# Patient Record
Sex: FEMALE | Race: WHITE | NOT HISPANIC OR LATINO | Employment: PART TIME | URBAN - METROPOLITAN AREA
[De-identification: names, ages, dates, MRNs, and addresses within clinical notes are randomized per-mention and may not be internally consistent; named-entity substitution may affect disease eponyms.]

---

## 2018-10-20 ENCOUNTER — OFFICE VISIT (OUTPATIENT)
Dept: URGENT CARE | Facility: CLINIC | Age: 23
End: 2018-10-20
Payer: COMMERCIAL

## 2018-10-20 VITALS
HEART RATE: 97 BPM | RESPIRATION RATE: 20 BRPM | HEIGHT: 69 IN | DIASTOLIC BLOOD PRESSURE: 69 MMHG | OXYGEN SATURATION: 95 % | TEMPERATURE: 98 F | BODY MASS INDEX: 24.44 KG/M2 | SYSTOLIC BLOOD PRESSURE: 109 MMHG | WEIGHT: 165 LBS

## 2018-10-20 DIAGNOSIS — L72.3 INFECTED SEBACEOUS CYST OF SKIN: Primary | ICD-10-CM

## 2018-10-20 DIAGNOSIS — L08.9 INFECTED SEBACEOUS CYST OF SKIN: Primary | ICD-10-CM

## 2018-10-20 PROCEDURE — 3008F BODY MASS INDEX DOCD: CPT | Mod: CPTII,S$GLB,, | Performed by: FAMILY MEDICINE

## 2018-10-20 PROCEDURE — 99214 OFFICE O/P EST MOD 30 MIN: CPT | Mod: S$GLB,,, | Performed by: FAMILY MEDICINE

## 2018-10-20 RX ORDER — MUPIROCIN 20 MG/G
OINTMENT TOPICAL
Qty: 22 G | Refills: 1 | Status: SHIPPED | OUTPATIENT
Start: 2018-10-20 | End: 2021-07-13

## 2018-10-20 RX ORDER — SULFAMETHOXAZOLE AND TRIMETHOPRIM 800; 160 MG/1; MG/1
1 TABLET ORAL 2 TIMES DAILY
Qty: 20 TABLET | Refills: 0 | Status: SHIPPED | OUTPATIENT
Start: 2018-10-20 | End: 2021-07-13

## 2018-10-20 RX ORDER — IBUPROFEN 800 MG/1
800 TABLET ORAL EVERY 8 HOURS PRN
Qty: 30 TABLET | Refills: 1 | Status: SHIPPED | OUTPATIENT
Start: 2018-10-20 | End: 2019-10-20

## 2018-10-20 RX ORDER — LISDEXAMFETAMINE DIMESYLATE 50 MG/1
50 CAPSULE ORAL
Refills: 0 | COMMUNITY
Start: 2018-09-26 | End: 2021-09-28 | Stop reason: ALTCHOICE

## 2018-10-20 NOTE — PROGRESS NOTES
"Subjective:       Patient ID: Koki Solano is a 23 y.o. female.    Vitals:  height is 5' 9" (1.753 m) and weight is 74.8 kg (165 lb). Her tympanic temperature is 98.4 °F (36.9 °C). Her blood pressure is 109/69 and her pulse is 97. Her respiration is 20 and oxygen saturation is 95%.     Chief Complaint: Abscess    This is a 23 y.o. female   with Past Medical History:  No date: ADD (attention deficit disorder with hyperactivity)  No date: Depression  No date: History of psychiatric care  No date: Psychiatric exam  No date: Psychiatric problem  No date: Self-injurious behavior  No date: Suicide attempt  No date: Therapy   and Past Surgical History:  January 2014: APPENDECTOMY  who presents today with a chief complaint of an abscess on the back of her neck that began to hurt a week ago. It's red and has grown in size.      Abscess   Chronicity:  NewProgression Since Onset: gradually worsening  Location:  Head/neck  Associated Symptoms: no fever, no chills  Characteristics: painful, redness and swelling    Pain Scale:  6/10  Treatments Tried:  Nothing  Relieved by:  Nothing    Review of Systems   Constitution: Negative for chills and fever.   HENT: Negative for sore throat.    Eyes: Negative for blurred vision.   Cardiovascular: Negative for chest pain.   Respiratory: Negative for shortness of breath.    Skin: Positive for color change. Negative for rash.   Musculoskeletal: Negative for back pain and joint pain.   Gastrointestinal: Negative for abdominal pain, diarrhea, nausea and vomiting.   Neurological: Negative for headaches.   Psychiatric/Behavioral: The patient is not nervous/anxious.        Objective:      Physical Exam   Constitutional: She appears well-developed and well-nourished.   Cardiovascular: Normal rate and regular rhythm.   Pulmonary/Chest: Effort normal and breath sounds normal.   Skin: Lesion (pea sized nodular lesion lower cervical region, tender) noted. There is erythema.   Nursing note and vitals " reviewed.      Assessment:       1. Infected sebaceous cyst of skin        Plan:         Infected sebaceous cyst of skin  -     Cancel: XR HIP 2 VIEW RIGHT; Future; Expected date: 10/20/2018  -     sulfamethoxazole-trimethoprim 800-160mg (BACTRIM DS) 800-160 mg Tab; Take 1 tablet by mouth 2 (two) times daily.  Dispense: 20 tablet; Refill: 0  -     mupirocin (BACTROBAN) 2 % ointment; Apply to affected area 3 times daily  Dispense: 22 g; Refill: 1  -     ibuprofen (ADVIL,MOTRIN) 800 MG tablet; Take 1 tablet (800 mg total) by mouth every 8 (eight) hours as needed.  Dispense: 30 tablet; Refill: 1  -     Incision & Drainage    followup with PCP

## 2018-10-20 NOTE — PATIENT INSTRUCTIONS
Epidermoid Cyst (Sebaceous Cyst), Infected (Antibiotic Treatment)  You have an epidermoid cyst. This is a small, painless lump under your skin. An epidermoid cyst (often called a sebaceous cyst, epidermal cyst, or epidermal inclusion cyst) is a term most often used for 2 similar types of cysts:  · Epidermoid cysts. These cysts form slowly under the skin. They can be found on most parts of the body. But they are most often found on areas with more hair such as the scalp, face, upper back, and genitals.  · Pilar cysts. These are similar to epidermoid cysts. But they start from a different part of the hair follicle. They are more likely to be on the scalp.  Here are some general facts about these cysts:  · A cyst is a sac filled with material that is often cheesy, fatty, oily, or stringy. The material inside can be thick. Or it can be a liquid.  · You can usually move the cyst slightly if you try.  · The cysts can be smaller than a pea or as large as a few inches.  · The cysts are usually not painful, unless they become inflamed or infected.  · The area around the cyst may smell bad. If the cyst breaks open, the material inside it often smells bad as well.  Your cyst became infected and your healthcare provider wanted to treat it with antibiotics. If the antibiotics dont clear up the infection, the cyst will need to be drained by making a small cut (incision). Local anesthesia will be used to numb the area.  Home care  · Resist the temptation to squeeze or pop the cyst, stick a needle in it, or cut it open. This often leads to a worsening infection and scarring.  · Take the antibiotic as directed until it is all used up.  · Soak the affected area in hot water or apply a hot pack (a thin, clean towel soaked in hot water) for 20 minutes at a time. Do this 3 to 4 times a day.  · Apply antibiotic cream or ointment 3 times a day.  · You may use over-the-counter pain medicine to control pain, unless another medicine was  given. If you have chronic liver or kidney disease or ever had a stomach ulcer or GI bleeding, talk with your healthcare provider before using these medicines.  Prevention  Once this infection has healed, reduce the risk of future infections by:  · Keeping the cyst area clean by bathing or showering daily  · Avoiding tight-fitting clothing in the cyst area  Follow-up care  Follow up with your healthcare provider, or as advised. If a gauze packing was put in your wound, it should be removed in a few days as advised by your healthcare provider. Check your wound every day for the signs listed below.  When to seek medical advice  Call your healthcare provider right away if any of these occur:  · Pus coming from the cyst  · Increasing redness around the wound  · Increasing local pain or swelling  · Fever of 100.4°F (38ºC) or higher, or as directed by your provider  Date Last Reviewed: 10/5/2016  © 1947-2849 [employer]. [address] [place] [address], [place] [address]. All rights reserved. This information is not intended as a substitute for professional medical care. Always follow your healthcare professional's instructions.

## 2018-10-20 NOTE — PROCEDURES
"Incision & Drainage  Date/Time: 10/20/2018 3:58 PM  Performed by: Camacho Pham MD  Authorized by: Camacho Pham MD     Time out: Immediately prior to procedure a "time out" was called to verify the correct patient, procedure, equipment, support staff and site/side marked as required.    Consent Done?:  Yes (Verbal)    Type:  Cyst (infected)  Body area:  Head/neck  Location details:  Neck  Anesthesia:  Local infiltration  Local anesthetic: lidocaine 1% with epinephrine  Anesthetic total (ml):  2  Risk factor:  Underlying major vessel  Scalpel size:  11  Incision type:  Single straight  Complexity:  Simple  Drainage:  Purulent  Drainage amount:  Scant  Wound treatment:  Wound left open, sebum removal, deloculation and expression of material (sebum and puruentt drainage)  Packing material:  None  Patient tolerance:  Patient tolerated the procedure well with no immediate complications      "

## 2019-04-22 ENCOUNTER — OFFICE VISIT (OUTPATIENT)
Dept: OBSTETRICS AND GYNECOLOGY | Facility: CLINIC | Age: 24
End: 2019-04-22
Payer: COMMERCIAL

## 2019-04-22 VITALS — HEIGHT: 69 IN | BODY MASS INDEX: 26.15 KG/M2 | WEIGHT: 176.56 LBS

## 2019-04-22 DIAGNOSIS — Z30.431 IUD CHECK UP: Primary | ICD-10-CM

## 2019-04-22 PROCEDURE — 99203 PR OFFICE/OUTPT VISIT, NEW, LEVL III, 30-44 MIN: ICD-10-PCS | Mod: S$GLB,,, | Performed by: NURSE PRACTITIONER

## 2019-04-22 PROCEDURE — 3008F BODY MASS INDEX DOCD: CPT | Mod: CPTII,S$GLB,, | Performed by: NURSE PRACTITIONER

## 2019-04-22 PROCEDURE — 99203 OFFICE O/P NEW LOW 30 MIN: CPT | Mod: S$GLB,,, | Performed by: NURSE PRACTITIONER

## 2019-04-22 PROCEDURE — 99999 PR PBB SHADOW E&M-EST. PATIENT-LVL III: ICD-10-PCS | Mod: PBBFAC,,, | Performed by: NURSE PRACTITIONER

## 2019-04-22 PROCEDURE — 3008F PR BODY MASS INDEX (BMI) DOCUMENTED: ICD-10-PCS | Mod: CPTII,S$GLB,, | Performed by: NURSE PRACTITIONER

## 2019-04-22 PROCEDURE — 99999 PR PBB SHADOW E&M-EST. PATIENT-LVL III: CPT | Mod: PBBFAC,,, | Performed by: NURSE PRACTITIONER

## 2019-04-22 NOTE — PROGRESS NOTES
CC: IUD string check    History of Present Illness: Koki Solano is a 23 y.o. female that presents today 4/22/2019 for   Pt [facility] wanting to have IUD strings checked. She had mirena IUD placed approx 2 years ago at [facility]. She reports that she has felt the strings in the past but has not been able to recently. She also got a cycle this past month, which is different for her because she has not had cycle since she had it placed. No other complaints or concerns noted.       Past Medical History:   Diagnosis Date    ADD (attention deficit disorder with hyperactivity)     Depression     History of psychiatric care     Psychiatric exam     Psychiatric problem     Self-injurious behavior     Suicide attempt     Therapy        Past Surgical History:   Procedure Laterality Date    APPENDECTOMY  January 2014       Current Outpatient Medications   Medication Sig Dispense Refill    AMPHETAMINE SALT COMBO 20 MG tablet       amphetamine-dextroamphetamine (AMPHETAMINE-DEXTROAMPHETAMINE) 10 mg Tab Take 10 mg by mouth 2 (two) times daily as needed. 60 tablet 0    fluoxetine (PROZAC) 20 MG capsule Take 20 mg by mouth once daily.      hydrocodone-acetaminophen (HYCET) solution 2.5-108 mg/5mL       ibuprofen (ADVIL,MOTRIN) 800 MG tablet Take 1 tablet (800 mg total) by mouth every 8 (eight) hours as needed. 30 tablet 1    methylphenidate (CONCERTA) 54 MG CR tablet Take 54 mg by mouth every morning.        mupirocin (BACTROBAN) 2 % ointment Apply to affected area 3 times daily 22 g 1    sulfamethoxazole-trimethoprim 800-160mg (BACTRIM DS) 800-160 mg Tab Take 1 tablet by mouth 2 (two) times daily. 20 tablet 0    VYVANSE 50 mg capsule 50 mg.  0     No current facility-administered medications for this visit.        Review of patient's allergies indicates:  No Known Allergies    Family History   Problem Relation Age of Onset    Depression Mother        Social History  "    Tobacco Use    Smoking status: Never Smoker    Smokeless tobacco: Never Used   Substance Use Topics    Alcohol use: Yes     Comment: socially    Drug use: No       OB History   No data available       Review of Symptoms:  GENERAL: Denies weight gain or weight loss. Feeling well overall.   SKIN: Denies rash or lesions.   HEAD: Denies head injury or headache.   ABDOMEN: No abdominal pain, constipation, diarrhea, nausea, vomiting or rectal bleeding.   URINARY: No frequency, dysuria, hematuria, or burning on urination.    Ht 5' 9.02" (1.753 m)   Wt 80.1 kg (176 lb 9.4 oz)   LMP 04/19/2019   Physical Exam:  APPEARANCE: Well nourished, well developed, in no acute distress.  SKIN: Normal skin turgor, no lesions.  RESPIRATORY: Normal respiratory effort with no retractions or use of accessory muscles  ABDOMEN: Soft. No tenderness or masses. No hepatosplenomegaly. No hernias.  PELVIC: Normal external female genitalia without lesions. Normal hair distribution. Adequate perineal body, normal urethral meatus. Urethra with no masses.  Bladder nontender. Vagina moist and well rugated without lesions or discharge. Cervix pink and without lesions; IUD strings visible and palpable. No significant cystocele or rectocele. Bimanual exam showed uterus normal size, shape, position, mobile and nontender. Adnexa without masses or tenderness. Urethra and bladder normal.      ASSESSMENT/PLAN:  IUD check up        -D/w pt that is possible to still have some cycles/bleeding while having the mirena. Pt reassured since IUD strings were visualized.     Follow-up:  RTC as needed    "

## 2019-11-05 ENCOUNTER — HOSPITAL ENCOUNTER (EMERGENCY)
Facility: HOSPITAL | Age: 24
Discharge: HOME | End: 2019-11-05
Attending: EMERGENCY MEDICINE
Payer: COMMERCIAL

## 2019-11-05 VITALS
HEIGHT: 70 IN | RESPIRATION RATE: 18 BRPM | DIASTOLIC BLOOD PRESSURE: 66 MMHG | HEART RATE: 100 BPM | BODY MASS INDEX: 27.92 KG/M2 | SYSTOLIC BLOOD PRESSURE: 145 MMHG | OXYGEN SATURATION: 98 % | TEMPERATURE: 99 F | WEIGHT: 195 LBS

## 2019-11-05 DIAGNOSIS — F33.1 MODERATE EPISODE OF RECURRENT MAJOR DEPRESSIVE DISORDER: Primary | ICD-10-CM

## 2019-11-05 PROBLEM — F32.9 MAJOR DEPRESSIVE DISORDER: Status: ACTIVE | Noted: 2019-11-05

## 2019-11-05 LAB
ALBUMIN SERPL BCP-MCNC: 4.1 G/DL (ref 3.5–5.2)
ALP SERPL-CCNC: 66 U/L (ref 55–135)
ALT SERPL W/O P-5'-P-CCNC: 10 U/L (ref 10–44)
AMPHET+METHAMPHET UR QL: NEGATIVE
ANION GAP SERPL CALC-SCNC: 7 MMOL/L (ref 8–16)
APAP SERPL-MCNC: <3 UG/ML (ref 10–20)
AST SERPL-CCNC: 14 U/L (ref 10–40)
B-HCG UR QL: NEGATIVE
BACTERIA #/AREA URNS AUTO: ABNORMAL /HPF
BARBITURATES UR QL SCN>200 NG/ML: NEGATIVE
BASOPHILS # BLD AUTO: 0.04 K/UL (ref 0–0.2)
BASOPHILS NFR BLD: 0.4 % (ref 0–1.9)
BENZODIAZ UR QL SCN>200 NG/ML: NEGATIVE
BILIRUB SERPL-MCNC: 0.2 MG/DL (ref 0.1–1)
BILIRUB UR QL STRIP: NEGATIVE
BUN SERPL-MCNC: 8 MG/DL (ref 6–20)
BZE UR QL SCN: NEGATIVE
CALCIUM SERPL-MCNC: 9.3 MG/DL (ref 8.7–10.5)
CANNABINOIDS UR QL SCN: NEGATIVE
CHLORIDE SERPL-SCNC: 106 MMOL/L (ref 95–110)
CLARITY UR REFRACT.AUTO: ABNORMAL
CO2 SERPL-SCNC: 24 MMOL/L (ref 23–29)
COLOR UR AUTO: YELLOW
CREAT SERPL-MCNC: 0.8 MG/DL (ref 0.5–1.4)
CREAT UR-MCNC: 109 MG/DL (ref 15–325)
CTP QC/QA: YES
DIFFERENTIAL METHOD: ABNORMAL
EOSINOPHIL # BLD AUTO: 0.4 K/UL (ref 0–0.5)
EOSINOPHIL NFR BLD: 3.6 % (ref 0–8)
ERYTHROCYTE [DISTWIDTH] IN BLOOD BY AUTOMATED COUNT: 12.9 % (ref 11.5–14.5)
EST. GFR  (AFRICAN AMERICAN): >60 ML/MIN/1.73 M^2
EST. GFR  (NON AFRICAN AMERICAN): >60 ML/MIN/1.73 M^2
ETHANOL SERPL-MCNC: <10 MG/DL
GLUCOSE SERPL-MCNC: 105 MG/DL (ref 70–110)
GLUCOSE UR QL STRIP: NEGATIVE
HCT VFR BLD AUTO: 45.3 % (ref 37–48.5)
HGB BLD-MCNC: 14.4 G/DL (ref 12–16)
HGB UR QL STRIP: NEGATIVE
IMM GRANULOCYTES # BLD AUTO: 0.02 K/UL (ref 0–0.04)
IMM GRANULOCYTES NFR BLD AUTO: 0.2 % (ref 0–0.5)
KETONES UR QL STRIP: NEGATIVE
LEUKOCYTE ESTERASE UR QL STRIP: ABNORMAL
LYMPHOCYTES # BLD AUTO: 3.3 K/UL (ref 1–4.8)
LYMPHOCYTES NFR BLD: 34.3 % (ref 18–48)
MCH RBC QN AUTO: 28.6 PG (ref 27–31)
MCHC RBC AUTO-ENTMCNC: 31.8 G/DL (ref 32–36)
MCV RBC AUTO: 90 FL (ref 82–98)
METHADONE UR QL SCN>300 NG/ML: NEGATIVE
MICROSCOPIC COMMENT: ABNORMAL
MONOCYTES # BLD AUTO: 0.8 K/UL (ref 0.3–1)
MONOCYTES NFR BLD: 8.6 % (ref 4–15)
NEUTROPHILS # BLD AUTO: 5.2 K/UL (ref 1.8–7.7)
NEUTROPHILS NFR BLD: 52.9 % (ref 38–73)
NITRITE UR QL STRIP: NEGATIVE
NRBC BLD-RTO: 0 /100 WBC
OPIATES UR QL SCN: NEGATIVE
PCP UR QL SCN>25 NG/ML: NEGATIVE
PH UR STRIP: 7 [PH] (ref 5–8)
PLATELET # BLD AUTO: 254 K/UL (ref 150–350)
PMV BLD AUTO: 10 FL (ref 9.2–12.9)
POTASSIUM SERPL-SCNC: 4 MMOL/L (ref 3.5–5.1)
PROT SERPL-MCNC: 7.4 G/DL (ref 6–8.4)
PROT UR QL STRIP: NEGATIVE
RBC # BLD AUTO: 5.03 M/UL (ref 4–5.4)
SODIUM SERPL-SCNC: 137 MMOL/L (ref 136–145)
SP GR UR STRIP: 1.01 (ref 1–1.03)
SQUAMOUS #/AREA URNS AUTO: 1 /HPF
TOXICOLOGY INFORMATION: NORMAL
TSH SERPL DL<=0.005 MIU/L-ACNC: 1.08 UIU/ML (ref 0.4–4)
URN SPEC COLLECT METH UR: ABNORMAL
WBC # BLD AUTO: 9.75 K/UL (ref 3.9–12.7)
WBC #/AREA URNS AUTO: 4 /HPF (ref 0–5)

## 2019-11-05 PROCEDURE — 84443 ASSAY THYROID STIM HORMONE: CPT

## 2019-11-05 PROCEDURE — 85025 COMPLETE CBC W/AUTO DIFF WBC: CPT

## 2019-11-05 PROCEDURE — 80307 DRUG TEST PRSMV CHEM ANLYZR: CPT

## 2019-11-05 PROCEDURE — 99285 PR EMERGENCY DEPT VISIT,LEVEL V: ICD-10-PCS | Mod: ,,, | Performed by: EMERGENCY MEDICINE

## 2019-11-05 PROCEDURE — 81025 URINE PREGNANCY TEST: CPT | Performed by: PHYSICIAN ASSISTANT

## 2019-11-05 PROCEDURE — 81001 URINALYSIS AUTO W/SCOPE: CPT

## 2019-11-05 PROCEDURE — 99283 EMERGENCY DEPT VISIT LOW MDM: CPT

## 2019-11-05 PROCEDURE — 80329 ANALGESICS NON-OPIOID 1 OR 2: CPT

## 2019-11-05 PROCEDURE — 99285 EMERGENCY DEPT VISIT HI MDM: CPT | Mod: ,,, | Performed by: EMERGENCY MEDICINE

## 2019-11-05 PROCEDURE — 80320 DRUG SCREEN QUANTALCOHOLS: CPT

## 2019-11-05 PROCEDURE — 80053 COMPREHEN METABOLIC PANEL: CPT

## 2019-11-06 NOTE — DISCHARGE INSTRUCTIONS
Follow-up with  your psychiatrist  Return to the emergency department for any new symptoms    Our goal in the emergency department is to always give you outstanding care and exceptional service. You may receive a survey by mail or e-mail in the next week regarding your experience in our ED. We would greatly appreciate your completing and returning the survey. Your feedback provides us with a way to recognize our staff who give very good care and it helps us learn how to improve when your experience was below our aspiration of excellence.

## 2019-11-06 NOTE — ED TRIAGE NOTES
Pt came from home. States she has been taking antidepressants. But has been feeling inadequate. 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 Pt tried to take life before by overdose on tylenol. Pt came in to be evaluated because she seen that she was going down that path again . She denies suicidal ideation and homocidal ideation at this time as she told this RN but wanted to come before she started thinking that way.     No chest pain, sob , or any other medical complaints   Pt aaoX4 and ambulatory with no assist.     Tony Hatch at bedside      Psychosocial:  Patient is calm and cooperative.  Patients insight and judgement are appropriate to situation.  Appears clean, well maintained, with clothing appropriate to environment.  No evidence of delusions, hallucinations, or psychosis.     Neuro:  Eyes open spontaneously.  Awake, alert, oriented x 4.  Speech clear and appropriate.  Tolerating saliva secretions well.  Able to follow commands, demonstrating ability to actively and appropriately communicate within context of current conversation.  Symmetrical facial muscles.  Moving all extremities well with no noted weakness.  Adequate muscle tone present.    Movement is purposeful.  No evidence of impaired sensation.  Response to external stimuli appropriately.  No noted drifts.     Airway:  Bilateral chest rise and fall.  RR regular and non-labored.  Air entry patent with and clear x 5 lobes of the lungs.  No crepitus or subcutaneous emphysema noted on palpation.       Circulatory:  Skin warm, dry, and pink.  Apical and radial pulses strong and regular.  Capillary refill/skin blanching less than 3 seconds to distal of 4 extremities.     Abdomen:  Abdomen soft and non-distended.  Positive normo-active bowel sounds x 4 quadrants.       Urinary: Denies pressure, frequency, urgency, odor or pain.     Extremities:  No redness, heat, deformity, or pain.     Skin:  Intact with no  bruising/discolorations noted.

## 2019-11-06 NOTE — ED PROVIDER NOTES
Encounter Date: 11/5/2019       History     Chief Complaint   Patient presents with    Suicidal     very depressed and last time spiraled out of control     18-year-old female to the ER for evaluation of major depressive disorder with concern for suicidal thoughts.  The patient reports a history of depression being managed in the outpatient setting.  She is currently taking Vyvanse and Prozac.  Currently she states that she is not suicidal and has no intent to hurt herself or harm anyone else but she is concerned about her worsening depression status and also concerned that she may develop suicidal thoughts.  She is calm and cooperative.  She is here with her significant other.  She does not have any signs of bodily injury or harm.  She denies any recent recreational drug use or alcohol use.        Review of patient's allergies indicates:  No Known Allergies  Past Medical History:   Diagnosis Date    ADD (attention deficit disorder with hyperactivity)     Depression     History of psychiatric care     Psychiatric exam     Psychiatric problem     Self-injurious behavior     Suicide attempt     Therapy      Past Surgical History:   Procedure Laterality Date    APPENDECTOMY  January 2014     Family History   Problem Relation Age of Onset    Depression Mother      Social History     Tobacco Use    Smoking status: Never Smoker    Smokeless tobacco: Never Used   Substance Use Topics    Alcohol use: Yes     Comment: socially    Drug use: No     Review of Systems   Constitutional: Negative for fever.   HENT: Negative for sore throat.    Respiratory: Negative for shortness of breath.    Cardiovascular: Negative for chest pain.   Gastrointestinal: Negative for nausea.   Genitourinary: Negative for dysuria.   Musculoskeletal: Negative for back pain.   Skin: Negative for rash.   Neurological: Negative for weakness.   Hematological: Does not bruise/bleed easily.   Psychiatric/Behavioral:        Depression        Physical Exam     Initial Vitals [11/05/19 1742]   BP Pulse Resp Temp SpO2   130/77 100 18 98.9 °F (37.2 °C) 98 %      MAP       --         Physical Exam    Constitutional: Vital signs are normal. She appears well-developed and well-nourished.   HENT:   Head: Normocephalic and atraumatic.   Eyes: Conjunctivae are normal.   Cardiovascular: Normal rate and regular rhythm.   Abdominal: Soft. Normal appearance and bowel sounds are normal.   Musculoskeletal: Normal range of motion.   Neurological: She is alert and oriented to person, place, and time.   Skin: Skin is warm and intact.   Psychiatric: Her speech is normal and behavior is normal. Cognition and memory are normal. She exhibits a depressed mood.   Denies SI or HI         ED Course   Procedures  Labs Reviewed   CBC W/ AUTO DIFFERENTIAL - Abnormal; Notable for the following components:       Result Value    Mean Corpuscular Hemoglobin Conc 31.8 (*)     All other components within normal limits   COMPREHENSIVE METABOLIC PANEL - Abnormal; Notable for the following components:    Anion Gap 7 (*)     All other components within normal limits   URINALYSIS, REFLEX TO URINE CULTURE - Abnormal; Notable for the following components:    Appearance, UA Cloudy (*)     Leukocytes, UA Trace (*)     All other components within normal limits    Narrative:     Preferred Collection Type->Urine, Clean Catch   ACETAMINOPHEN LEVEL - Abnormal; Notable for the following components:    Acetaminophen (Tylenol), Serum <3.0 (*)     All other components within normal limits   URINALYSIS MICROSCOPIC - Abnormal; Notable for the following components:    Bacteria Few (*)     All other components within normal limits    Narrative:     Preferred Collection Type->Urine, Clean Catch   TSH   DRUG SCREEN PANEL, URINE EMERGENCY    Narrative:     Preferred Collection Type->Urine, Clean Catch   ALCOHOL,MEDICAL (ETHANOL)   POCT URINE PREGNANCY          Imaging Results    None          Medical  Decision Making:   History:   Old Medical Records: I decided to obtain old medical records.  Old Records Summarized: other records.       <> Summary of Records: Old medical records  Initial Assessment:   24-year-old female with major depressive disorder concern for possible suicidal thoughts  Differential Diagnosis:   Major depressive disorder, acute psychosis, borderline disorder, schizophrenia  ED Management:  24-year-old female with major depressive disorder.  Patient does not meet clinical criteria for PEC based on my exam is she denies SI or HI.  Psychiatry has been consulted and has seen the patient.  I discussed the case with them.  They agree that the patient is not meet criteria for a PEC at this time.  She will be discharged home.  The patient has a plan to contact her outpatient psychiatrist for an appointment this week.  Return precautions have been given.  And a safety plan is in place.  Other:   I discussed test(s) with the performing physician.  I have discussed this case with another health care provider.                                 Clinical Impression:       ICD-10-CM ICD-9-CM   1. Moderate episode of recurrent major depressive disorder F33.1 296.32         Disposition:   Disposition: Discharged  Condition: Stable                     Jg Terrazas PA-C  11/05/19 214

## 2019-11-06 NOTE — ED NOTES
Pt received discharge instructions. Verbalizes understanding of discharge instructions. No IV to remove.

## 2019-11-06 NOTE — ED NOTES
Pt placed in blue PEC scrubs . And wanded by security. Pt belongings given to tony Hatch whose phone number is listed on PEC packet . ( belongings include phone , clothing, silver ring X1, pair of flip flops , and bra) . No belongings will be left with patient. Tony will bring home.

## 2019-11-06 NOTE — CONSULTS
"[facility]  Psychiatry  Consult Note    Patient Name: Koki Solano  MRN: [id number]   Code Status: No Order  Admission Date: 11/5/2019  Hospital Length of Stay: 0 days  Attending Physician: No att. providers found  Primary Care Provider: Primary Doctor No    Current Legal Status: FVA    Patient information was obtained from patient, spouse/SO, parent, past medical records and ER records.   Inpatient consult to Psychiatry  Consult performed by: Heena Zimmerman MD  Consult ordered by: Ashlee Sloan PA-C        Subjective:     Principal Problem: SI    Chief Complaint:  Feeling overwhelmed and stressed out     HPI:   Koki Solano is a 24 y.o. female with a past psychiatric history of Major depressive disorder and ADHD who presented to [facility] due to concern for SI. Psychiatry was consulted for "SI".    Per Primary Team:  18-year-old female to the ER for evaluation of major depressive disorder with concern for suicidal thoughts.  The patient reports a history of depression being managed in the outpatient setting.  She is currently taking Vyvanse and Prozac.  Currently she states that she is not suicidal and has no intent to hurt herself or harm anyone else but she is concerned about her worsening depression status and also concerned that she may develop suicidal thoughts.  She is calm and cooperative.  She is here with her significant other.  She does not have any signs of bodily injury or harm.  She denies any recent recreational drug use or alcohol use.       Per Psychiatry:  Patient states that she has been feeling overwhelmed this past week with school. She has been in law school for about 2 years, and is in her final semesters. She states that she has been struggling academically since being in law school, which is very different compared to undergrad. She states that this has been making her mood lower, and has been decreasing her interest in doing school related activities. She " denies any other changes in other aspects of her life. She still enjoys hanging out with her friends, and her fiancee. She also still enjoys going to a clinic associated with  law school, where she can practice with supervision. She denies changes in her mood prior to this past week. She denies any SI or SA over the past week. She feels safe and denies SI currently. She does endorse a previous SA about 2 years ago via Tylenol overdose. She told her fiancee, but was not admitted to a psych hospital or presented to the ED. Her psychiatrist Dr. Chiu in [facility], and she has been on Prozac for about 8 years. She was increased to 60 mg about a year ago to assist with depressed mood earlier in law school. She also takes Vyvanse and Adderall for her ADHD (both confirmed through [place] ). She denies HI/AVH/delusions.    Collateral:   Patient's mother and significant other were interviewed as well, and confirm the narrative above.     Medical Review of Systems:  Pertinent items are noted in HPI.    Psychiatric Review of Systems-is patient experiencing or having changes in  sleep: no  appetite: no  weight: no  energy/anergy: no  interest/pleasure/anhedonia: yes  somatic symptoms: no  libido: no  anxiety/panic: yes  guilty/hopelessness: no  concentration: yes  S.I.B.s/risky behavior: yes  any drugs: yes  alcohol: yes     Allergies:  Patient has no known allergies.    Past Medical/Surgical History:  Past Medical History:   Diagnosis Date    ADD (attention deficit disorder with hyperactivity)     Depression     History of psychiatric care     Psychiatric exam     Psychiatric problem     Self-injurious behavior     Suicide attempt     Therapy      Past Surgical History:   Procedure Laterality Date    APPENDECTOMY  January 2014       Past Psychiatric History:  Previous Medication Trials: yes, Prozac, Adderall, Vyvanse, and Klonopin   Previous Psychiatric Hospitalizations: no   Previous Suicide Attempts: yes, via  tylenol overdose about 2 years ago    History of Violence: no  Outpatient Psychiatrist: yes, Dr. Ashley Chiu    Social History:  Marital Status: fiancee  Children: 0   Employment Status/Info: Student in Law School  Education: student Law School  Special Ed: no  Housing Status: With kendy  History of phys/sexual abuse: no  Access to gun: no    Substance Abuse History:  Recreational Drugs: marijuana  Use of Alcohol: occasional, social use  Rehab History: no   Tobacco Use: no  Use of OTC: none    Legal History:  Past Charges/Incarcerations: no,    Pending charges: no     Family Psychiatric History:   Mother with depression, paternal side with substance use mainly EtOH     Psychosocial Stressors: school  Functioning Relationships: good support system, good relationship with spouse or significant other and good relationship with parents      Hospital Course: No notes on file         Patient History           Medical as of 11/5/2019     Past Medical History     Diagnosis Date Comments Source    ADD (attention deficit disorder with hyperactivity) -- -- Provider    Depression -- -- Provider    History of psychiatric care -- -- Provider    Psychiatric exam -- -- Provider    Psychiatric problem -- -- Provider    Self-injurious behavior -- -- Provider    Suicide attempt -- -- Provider    Therapy -- -- Provider          Pertinent Negatives     Diagnosis Date Noted Comments Source    Asthma 09/28/2014 -- Provider    Behavioral problem 12/20/2012 -- Provider    Diabetes mellitus 09/28/2014 -- Provider    Encounter for blood transfusion 09/28/2014 -- Provider    History of psychiatric hospitalization 12/20/2012 -- Provider    Hypertension 09/28/2014 -- Provider    Kaela 12/20/2012 -- Provider    Renal disorder 09/28/2014 -- Provider    Seizures 09/28/2014 -- Provider                  Surgical as of 11/5/2019     Past Surgical History     Procedure Laterality Date Comments Source    APPENDECTOMY -- January 2014 -- Provider                   Family as of 11/5/2019     Problem Relation Name Age of Onset Comments Source    Depression Mother -- -- -- Provider            Tobacco Use as of 11/5/2019     Smoking Status Smoking Start Date Smoking Quit Date Packs/Day Years Used    Never Smoker -- -- -- --    Types Comments Smokeless Tobacco Status Smokeless Tobacco Quit Date Source    -- -- Never Used -- Provider            Alcohol Use as of 11/5/2019     Alcohol Use Drinks/Week Alcohol/Week Comments Source    Yes -- -- socially Provider    Frequency Standard Drinks Binge Drinking        -- -- --              Drug Use as of 11/5/2019     Drug Use Types Frequency Comments Source    No -- -- -- Provider            Sexual Activity as of 11/5/2019     Sexually Active Birth Control Partners Comments Source    Not Currently None Male -- Provider            Activities of Daily Living as of 11/5/2019     Activities of Daily Living Question Response Comments Source    Caffeine Use No -- Provider    Financial Status: Disabled Not Asked -- Provider    Legal: Involved in criminal litigation No -- Provider    Caffeine Use: Frequent Not Asked -- Provider    Financial Status: Employed Yes -- Provider    Legal: Other No -- Provider    Caffeine Use: Moderate Not Asked -- Provider    Financial Status: Unemployed Not Asked -- Provider    Leisure: Exercise Yes -- Provider    Caffeine Use: Substantial Not Asked -- Provider    Financial Status: Other Not Asked -- Provider    Leisure: Fishing Not Asked -- Provider    Childhood History: Adopted Not Asked -- Provider    Firearms: Does patient have access to a firearm? No -- Provider    Leisure: Hunting Not Asked -- Provider    Childhood History: Early trauma Not Asked -- Provider    Home situation: homeless Not Asked -- Provider    Leisure: Movie Watching Yes -- Provider    Childhood History: Raised by parents Yes -- Provider    Home situation: lives alone Yes -- Provider    Leisure: Shopping Not Asked -- Provider     Childhood History: Uneventful Not Asked -- Provider    Home situation: lives in group home Not Asked -- Provider    Leisure: Sports Not Asked -- Provider    Childhood History: Other Not Asked -- Provider    Home situation: lives in nursing home Not Asked -- Provider    Leisure: Time with family Yes -- Provider    Education: Unfinished High School Not Asked -- Provider    Home situation: lives in shelter Not Asked -- Provider     Service No -- Provider    Education: High School Graduate Yes -- Provider    Home situation: lives with family Not Asked -- Provider    Spirituality: Active Participation Not Asked -- Provider    Education: Unfinished college Yes -- Provider    Home situation: lives with friends Not Asked -- Provider    Spirituality: Organized Tenriism Not Asked -- Provider    Education: Trade School Not Asked -- Provider    Home situation: lives with significant other Not Asked -- Provider    Spirituality: Private Participation Yes -- Provider    Education: Associate's Degree Not Asked -- Provider    Home situation: lives with spouse Not Asked -- Provider    Patient feels they ought to cut down on drinking/drug use No -- Provider    Education: Bachelor's Degree Not Asked -- Provider    Legal consequences of chemical use No -- Provider    Patient annoyed by others criticizing their drinking/drug use No -- Provider    Education: More than one Bachelor's or Professional Not Asked -- Provider    Legal: Arrest history No -- Provider    Patient has felt bad or guilty about drinking/drug use No -- Provider    Education: Master's, PhD Not Asked -- Provider    Legal: Involved in civil litigation No -- Provider    Patient has had a drink/used drugs as an eye opener in the AM No -- Provider            Social Documentation as of 11/5/2019    None           Occupational as of 11/5/2019     Occupation Employer Comments Source    student  -- -- Provider            Socioeconomic as of 11/5/2019     Marital Status  Spouse Name Number of Children Years Education Education Level Preferred Language Ethnicity Race Source    Single -- -- -- -- English /White White Provider    Financial Resource Strain Food Insecurity: Worry Food Insecurity: Inability Transportation Needs: Medical Transportation Needs: Non-medical    -- -- -- -- --            Pertinent History     Question Response Comments    Lives with -- --    Place in Birth Order -- --    Lives in -- --    Number of Siblings -- --    Raised by -- --    Legal Involvement -- --    Childhood Trauma -- --    Criminal History of -- --    Financial Status -- --    Highest Level of Education -- --    Does patient have access to a firearm? -- --     Service -- --    Primary Leisure Activity -- --    Spirituality -- --        Past Medical History:   Diagnosis Date    ADD (attention deficit disorder with hyperactivity)     Depression     History of psychiatric care     Psychiatric exam     Psychiatric problem     Self-injurious behavior     Suicide attempt     Therapy      Past Surgical History:   Procedure Laterality Date    APPENDECTOMY  January 2014     Family History     Problem Relation (Age of Onset)    Depression Mother        Tobacco Use    Smoking status: Never Smoker    Smokeless tobacco: Never Used   Substance and Sexual Activity    Alcohol use: Yes     Comment: socially    Drug use: No    Sexual activity: Not Currently     Partners: Male     Birth control/protection: None     Review of patient's allergies indicates:  No Known Allergies    No current facility-administered medications on file prior to encounter.      Current Outpatient Medications on File Prior to Encounter   Medication Sig    AMPHETAMINE SALT COMBO 20 MG tablet     amphetamine-dextroamphetamine (AMPHETAMINE-DEXTROAMPHETAMINE) 10 mg Tab Take 10 mg by mouth 2 (two) times daily as needed.    fluoxetine (PROZAC) 20 MG capsule Take 20 mg by mouth once daily.     "hydrocodone-acetaminophen (HYCET) solution 2.5-108 mg/5mL     methylphenidate (CONCERTA) 54 MG CR tablet Take 54 mg by mouth every morning.      mupirocin (BACTROBAN) 2 % ointment Apply to affected area 3 times daily    sulfamethoxazole-trimethoprim 800-160mg (BACTRIM DS) 800-160 mg Tab Take 1 tablet by mouth 2 (two) times daily.    VYVANSE 50 mg capsule 50 mg.     Psychotherapeutics (From admission, onward)    None        Review of Systems   Constitutional: Negative for fatigue and fever.   Respiratory: Negative for chest tightness and shortness of breath.    Cardiovascular: Negative for chest pain.   Gastrointestinal: Negative for nausea and vomiting.   Genitourinary: Negative for dysuria, frequency and urgency.   Neurological: Negative for seizures and headaches.   Psychiatric/Behavioral: Negative for behavioral problems, self-injury and suicidal ideas.     Strengths and Liabilities: Strength: Patient accepts guidance/feedback, Strength: Patient is expressive/articulate., Strength: Patient is intelligent., Strength: Patient is motivated for change., Strength: Patient has positive support network., Strength: Patient has reasonable judgment.    Objective:     Vital Signs (Most Recent):  Temp: 98.9 °F (37.2 °C) (11/05/19 1742)  Pulse: 100 (11/05/19 1742)  Resp: 18 (11/05/19 1742)  BP: (!) 145/66 (11/05/19 2145)  SpO2: 98 % (11/05/19 1742) Vital Signs (24h Range):  Temp:  [98.9 °F (37.2 °C)] 98.9 °F (37.2 °C)  Pulse:  [100] 100  Resp:  [18] 18  SpO2:  [98 %] 98 %  BP: (130-145)/(66-77) 145/66     Height: 5' 10" (177.8 cm)  Weight: 88.5 kg (195 lb)  Body mass index is 27.98 kg/m².    No intake or output data in the 24 hours ending 11/05/19 2217    Physical Exam   Psychiatric:   Mental Status Exam:  Appearance: fair hygiene and grooming, casually dressed, NAD, appears stated age   Behavior/Cooperation: calm, cooperative  Language: fluent english  Speech: normal tone, normal rate, normal pitch, normal volume  Mood: " ""not good"  Affect: full, reactive, appropriate  Thought Process: linear, logical, goal-directed  Thought Content:  denies SI/HI/paranoia/delusions; no objective evidence of paranoia or delusions  Perception: denies AVH; no objective evidence of AVH   Orientation: grossly intact  Memory: intact to conversation  Attention Span/Concentration: intact to conversation  Fund of Knowledge: appropriate for education level  Insight: good  Judgment: good        Significant Labs:   Last 24 Hours:   Recent Lab Results       11/05/19  1841   11/05/19  1834        Benzodiazepines Negative       Methadone metabolites Negative       Phencyclidine Negative       Acetaminophen (Tylenol), Serum   <3.0  Comment:  Toxic Levels:  Adults (4 hr post-ingestion).........>150 ug/mL  Adults (12 hr post-ingestion)........>40 ug/mL  Peds (2 hr post-ingestion, liquid)...>225 ug/mL       Albumin   4.1     Alcohol, Medical, Serum   <10     Alkaline Phosphatase   66     ALT   10     Amphetamine Screen, Ur Negative       Anion Gap   7     Appearance, UA Cloudy       AST   14     Bacteria, UA Few       Barbiturate Screen, Ur Negative       Baso #   0.04     Basophil%   0.4     Bilirubin (UA) Negative       BILIRUBIN TOTAL   0.2  Comment:  For infants and newborns, interpretation of results should be based  on gestational age, weight and in agreement with clinical  observations.  Premature Infant recommended reference ranges:  Up to 24 hours.............<8.0 mg/dL  Up to 48 hours............<12.0 mg/dL  3-5 days..................<15.0 mg/dL  6-29 days.................<15.0 mg/dL       BUN, Bld   8     Calcium   9.3     Chloride   106     CO2   24     Cocaine (Metab.) Negative       Color, UA Yellow       Creatinine   0.8     Creatinine, Random Ur 109.0  Comment:  The random urine reference ranges provided were established   for 24 hour urine collections.  No reference ranges exist for  random urine specimens.  Correlate clinically.         Differential " Method   Automated     eGFR if    >60.0     eGFR if non    >60.0  Comment:  Calculation used to obtain the estimated glomerular filtration  rate (eGFR) is the CKD-EPI equation.        Eos #   0.4     Eosinophil%   3.6     Glucose   105     Glucose, UA Negative       Gran # (ANC)   5.2     Gran%   52.9     Hematocrit   45.3     Hemoglobin   14.4     Immature Grans (Abs)   0.02  Comment:  Mild elevation in immature granulocytes is non specific and   can be seen in a variety of conditions including stress response,   acute inflammation, trauma and pregnancy. Correlation with other   laboratory and clinical findings is essential.       Immature Granulocytes   0.2     Ketones, UA Negative       Leukocytes, UA Trace       Lymph #   3.3     Lymph%   34.3     MCH   28.6     MCHC   31.8     MCV   90     Microscopic Comment SEE COMMENT  Comment:  Other formed elements not mentioned in the report are not   present in the microscopic examination.          Mono #   0.8     Mono%   8.6     MPV   10.0     NITRITE UA Negative       nRBC   0     Occult Blood UA Negative       Opiate Scrn, Ur Negative       pH, UA 7.0       Platelets   254     Potassium   4.0     Preg Test, Ur Negative       PROTEIN TOTAL   7.4     Protein, UA Negative  Comment:  Recommend a 24 hour urine protein or a urine   protein/creatinine ratio if globulin induced proteinuria is  clinically suspected.          Acceptable Yes       RBC   5.03     RDW   12.9     Sodium   137     Specific Gravity, UA 1.015       Specimen UA Urine, Clean Catch       Squam Epithel, UA 1       Marijuana (THC) Metabolite Negative       Toxicology Information SEE COMMENT  Comment:  This screen includes the following classes of drugs at the   listed cut-off:  Benzodiazepines                  200 ng/ml  Methadone                        300 ng/ml  Cocaine metabolite               300 ng/ml  Opiates                          300  ng/ml  Barbiturates                     200 ng/ml  Amphetamines                    1000 ng/ml  Marijuana metabs (THC)            50 ng/ml  Phencyclidine (PCP)               25 ng/ml  High concentrations of Diphenhydramine may cross-react with  Phencyclidine PCP screening immunoassay giving a false   positive result.  High concentrations of Methylenedioxymethamphetamine (MDMA aka  Ectasy) and other structurally similar compounds may cross-   react with the Amphetamine/Methamphetamine screening   immunoassay giving a false positive result.  A metabolite of the anti-HIV drug Sustiva () may cause  false positive results in the Marijuana metabolite (THC)   screening assay.  Note: This exception list includes only more common   interferants in toxicology screen testing.  Because of many   cross-reactantspositive results on toxicology drug screens   should be confirmed whenever results do not correlate with   clinical presentation.  This report is intended for use in clinical monitoring and  management of patients. It is not intended for use in   employment related drug testing.  Because of any cross-reactants, positive results on toxicology  drug screens should be confirmed whenever results do not  correlate with clinical presentation.  Presumptive positive results are unconfirmed and may be used   only for medical purposes.  Assay Intended Use: This assay provides only a preliminary analytical  test result. A more specific alternate chemical method must be used  to obtain a confirmed analytical result. Gas chromatography/mass  spectrometry (GS/MS)is the preferred confirmatory method. Clinical  consideration and professional judgement should be applied to any   drug of abuse test result, particularly when preliminary results  are used.         TSH   1.085     WBC, UA 4       WBC   9.75           Significant Imaging: None    Assessment/Plan:     Major depressive disorder  ASSESSMENT     Koki Solano is a 24 y.o.  female with a past psychiatric history of major depressive disorder and ADHD, who presented to the [facility] due to concern for SI. Patient denies SI or any plan. Does endorse worsening mood symptoms in relation to stressors from law school.    IMPRESSION  Acute Adjustment Disorder  Major Depressive Disorder  ADHD    RECOMMENDATION(S)      Recommend that patient follow up with primary psychiatrist within the week. Patient was informed to make an appointment within the week to see if her Prozac could be adjusted, since it has been stable for about a year. Patient was also encouraged to obtain a therapist either through school, or her health insurance, since she benefited in the past.     Legal Status/Precaution(s):  Patient does not meet criteria for PEC or inpatient psychiatric admission at this time. Recommend to rescind PEC if one was placed. Patient is not currently an imminent danger to self or others and is not gravely disabled due to a psychiatric illness.    Patient is also encouraged to return to the ED if she has any SI/SA.         Total Time:  60 minutes      Heena Zimmerman MD   Psychiatry  [facility]

## 2019-11-06 NOTE — ASSESSMENT & PLAN NOTE
ASSESSMENT     Koki Solano is a 24 y.o. female with a past psychiatric history of major depressive disorder and ADHD, who [place] [facility] [place] to concern for [place]. Patient denies SI or any plan. Does endorse worsening mood symptoms in relation to stressors from law school.    IMPRESSION  Acute Adjustment Disorder  Major Depressive Disorder  ADHD    RECOMMENDATION(S)      Recommend that patient follow up with primary psychiatrist within the week. Patient was informed to make an appointment within the week to see if her Prozac could be adjusted, since it has been stable for about a year. Patient was also encouraged to obtain a therapist either through school, or her health insurance, since she benefited in the past.     Legal Status/Precaution(s):  Patient does not meet criteria for PEC or inpatient psychiatric admission at this time. Recommend to rescind PEC if one was placed. Patient is not currently an imminent danger to self or others and is not gravely disabled due to a psychiatric illness.    Patient is also encouraged to return to the ED if she has any SI/SA.

## 2019-11-06 NOTE — HPI
"Koki Solano is a 24 y.o. female with a past psychiatric history of Major depressive disorder and ADHD who presented to [facility] due to concern for SI. Psychiatry was consulted for "SI".    Per Primary Team:  18-year-old female to the ER for evaluation of major depressive disorder with concern for suicidal thoughts.  The patient reports a history of depression being managed in the outpatient setting.  She is currently taking Vyvanse and Prozac.  Currently she states that she is not suicidal and has no intent to hurt herself or harm anyone else but she is concerned about her worsening depression status and also concerned that she may develop suicidal thoughts.  She is calm and cooperative.  She is here with her significant other.  She does not have any signs of bodily injury or harm.  She denies any recent recreational drug use or alcohol use.       Per Psychiatry:  Patient states that she has been feeling overwhelmed this past week with school. She has been in law school for about 2 years, and is in her final semesters. She states that she has been struggling academically since being in law school, which is very different compared to [facility]. She states that this has been making her mood lower, and has been decreasing her interest in doing school related activities. She denies any other changes in other aspects of her life. She still enjoys hanging out with her friends, and her fiancee. She also still enjoys going to a clinic associated with  [employer], where she can practice with supervision. She denies changes in her mood prior to this past week. She denies any SI or SA over the past week. She feels safe and denies SI currently. She does endorse a previous SA about 2 years ago via Tylenol overdose. She told her fiancee, but was not admitted to a psych hospital or presented to the ED. Her psychiatrist Dr. Chiu in [facility], and she has been on Prozac for about 8 years. She was increased to 60 mg about a year ago " to assist with depressed mood earlier in law school. She also takes Vyvanse and Adderall for her ADHD (both confirmed through [place] ). She denies HI/AVH/delusions.    Collateral:   Patient's mother and significant other were interviewed as well, and confirm the narrative above.     Medical Review of Systems:  Pertinent items are noted in HPI.    Psychiatric Review of Systems-is patient experiencing or having changes in  sleep: no  appetite: no  weight: no  energy/anergy: no  interest/pleasure/anhedonia: yes  somatic symptoms: no  libido: no  anxiety/panic: yes  guilty/hopelessness: no  concentration: yes  S.I.B.s/risky behavior: yes  any drugs: yes  alcohol: yes     Allergies:  Patient has no known allergies.    Past Medical/Surgical History:  Past Medical History:   Diagnosis Date    ADD (attention deficit disorder with hyperactivity)     Depression     History of psychiatric care     Psychiatric exam     Psychiatric problem     Self-injurious behavior     Suicide attempt     Therapy      Past Surgical History:   Procedure Laterality Date    APPENDECTOMY  January 2014       Past Psychiatric History:  Previous Medication Trials: yes, Prozac, Adderall, Vyvanse, and Klonopin   Previous Psychiatric Hospitalizations: no   Previous Suicide Attempts: yes, via tylenol overdose about 2 years ago    History of Violence: no  Outpatient Psychiatrist: yes, Dr. Ashley Chiu    Social History:  Marital Status: fiancee  Children: 0   Employment Status/Info: Student in Law School  Education: student Law School  Special Ed: no  Housing Status: With fiancee  History of phys/sexual abuse: no  Access to gun: no    Substance Abuse History:  Recreational Drugs: marijuana  Use of Alcohol: occasional, social use  Rehab History: no   Tobacco Use: no  Use of OTC: none    Legal History:  Past Charges/Incarcerations: no,    Pending charges: no     Family Psychiatric History:   Mother with depression, paternal side with substance use  mainly EtOH     Psychosocial Stressors: school  Functioning Relationships: good support system, good relationship with spouse or significant other and good relationship with parents

## 2019-11-06 NOTE — ED NOTES
Pt no longer going to [facility] after consult with psychiatrist and coorelation with ED Physician . Plan of care = Pt going home per ED Physician and physician assistant

## 2019-11-06 NOTE — SUBJECTIVE & OBJECTIVE
Patient History           Medical as of 11/5/2019     Past Medical History     Diagnosis Date Comments Source    ADD (attention deficit disorder with hyperactivity) -- -- Provider    Depression -- -- Provider    History of psychiatric care -- -- Provider    Psychiatric exam -- -- Provider    Psychiatric problem -- -- Provider    Self-injurious behavior -- -- Provider    Suicide attempt -- -- Provider    Therapy -- -- Provider          Pertinent Negatives     Diagnosis Date Noted Comments Source    Asthma 09/28/2014 -- Provider    Behavioral problem 12/20/2012 -- Provider    Diabetes mellitus 09/28/2014 -- Provider    Encounter for blood transfusion 09/28/2014 -- Provider    History of psychiatric hospitalization 12/20/2012 -- Provider    Hypertension 09/28/2014 -- Provider    Kaela 12/20/2012 -- Provider    Renal disorder 09/28/2014 -- Provider    Seizures 09/28/2014 -- Provider                  Surgical as of 11/5/2019     Past Surgical History     Procedure Laterality Date Comments Source    APPENDECTOMY -- January 2014 -- Provider                  Family as of 11/5/2019     Problem Relation Name Age of Onset Comments Source    Depression Mother -- -- -- Provider            Tobacco Use as of 11/5/2019     Smoking Status Smoking Start Date Smoking Quit Date Packs/Day Years Used    Never Smoker -- -- -- --    Types Comments Smokeless Tobacco Status Smokeless Tobacco Quit Date Source    -- -- Never Used -- Provider            Alcohol Use as of 11/5/2019     Alcohol Use Drinks/Week Alcohol/Week Comments Source    Yes -- -- socially Provider    Frequency Standard Drinks Binge Drinking        -- -- --              Drug Use as of 11/5/2019     Drug Use Types Frequency Comments Source    No -- -- -- Provider            Sexual Activity as of 11/5/2019     Sexually Active Birth Control Partners Comments Source    Not Currently None Male -- Provider            Activities of Daily Living as of 11/5/2019     Activities of  Daily Living Question Response Comments Source    Caffeine Use No -- Provider    Financial Status: Disabled Not Asked -- Provider    Legal: Involved in criminal litigation No -- Provider    Caffeine Use: Frequent Not Asked -- Provider    Financial Status: Employed Yes -- Provider    Legal: Other No -- Provider    Caffeine Use: Moderate Not Asked -- Provider    Financial Status: Unemployed Not Asked -- Provider    Leisure: Exercise Yes -- Provider    Caffeine Use: Substantial Not Asked -- Provider    Financial Status: Other Not Asked -- Provider    Leisure: Fishing Not Asked -- Provider    Childhood History: Adopted Not Asked -- Provider    Firearms: Does patient have access to a firearm? No -- Provider    Leisure: Hunting Not Asked -- Provider    Childhood History: Early trauma Not Asked -- Provider    Home situation: homeless Not Asked -- Provider    Leisure: Movie Watching Yes -- Provider    Childhood History: Raised by parents Yes -- Provider    Home situation: lives alone Yes -- Provider    Leisure: Shopping Not Asked -- Provider    Childhood History: Uneventful Not Asked -- Provider    Home situation: lives in group home Not Asked -- Provider    Leisure: Sports Not Asked -- Provider    Childhood History: Other Not Asked -- Provider    Home situation: lives in nursing home Not Asked -- Provider    Leisure: Time with family Yes -- Provider    Education: Unfinished High School Not Asked -- Provider    Home situation: lives in shelter Not Asked -- Provider     Service No -- Provider    Education: High School Graduate Yes -- Provider    Home situation: lives with family Not Asked -- Provider    Spirituality: Active Participation Not Asked -- Provider    Education: Unfinished college Yes -- Provider    Home situation: lives with friends Not Asked -- Provider    Spirituality: Organized [employer] Not Asked -- Provider    Education: Trade School Not Asked -- Provider    Home situation: lives with significant  other Not Asked -- Provider    Spirituality: Private Participation Yes -- Provider    Education: Associate's Degree Not Asked -- Provider    Home situation: lives with spouse Not Asked -- Provider    Patient feels they ought to cut down on drinking/drug use No -- Provider    Education: Bachelor's Degree Not Asked -- Provider    Legal consequences of chemical use No -- Provider    Patient annoyed by others criticizing their drinking/drug use No -- Provider    Education: More than one Bachelor's or Professional Not Asked -- Provider    Legal: Arrest history No -- Provider    Patient has felt bad or guilty about drinking/drug use No -- Provider    Education: Master's, PhD Not Asked -- Provider    Legal: Involved in civil litigation No -- Provider    Patient has had a drink/used drugs as an eye opener in the AM No -- Provider            Social Documentation as of 11/5/2019    None           Occupational as of 11/5/2019     Occupation Employer Comments Source    student  -- -- Provider            Socioeconomic as of 11/5/2019     Marital Status Spouse Name Number of Children Years Education Education Level Preferred Language Ethnicity Race Source    Single -- -- -- -- English /White White Provider    Financial Resource Strain Food Insecurity: Worry Food Insecurity: Inability Transportation Needs: Medical Transportation Needs: Non-medical    -- -- -- -- --            Pertinent History     Question Response Comments    Lives with -- --    Place in Birth Order -- --    Lives in -- --    Number of Siblings -- --    Raised by -- --    Legal Involvement -- --    Childhood Trauma -- --    Criminal History of -- --    Financial Status -- --    Highest Level of Education -- --    Does patient have access to a firearm? -- --     Service -- --    Primary Leisure Activity -- --    Spirituality -- --        Past Medical History:   Diagnosis Date    ADD (attention deficit disorder with hyperactivity)     Depression      History of psychiatric care     Psychiatric exam     Psychiatric problem     Self-injurious behavior     Suicide attempt     Therapy      Past Surgical History:   Procedure Laterality Date    APPENDECTOMY  January 2014     Family History     Problem Relation (Age of Onset)    Depression Mother        Tobacco Use    Smoking status: Never Smoker    Smokeless tobacco: Never Used   Substance and Sexual Activity    Alcohol use: Yes     Comment: socially    Drug use: No    Sexual activity: Not Currently     Partners: Male     Birth control/protection: None     Review of patient's allergies indicates:  No Known Allergies    No current facility-administered medications on file prior to encounter.      Current Outpatient Medications on File Prior to Encounter   Medication Sig    AMPHETAMINE SALT COMBO 20 MG tablet     amphetamine-dextroamphetamine (AMPHETAMINE-DEXTROAMPHETAMINE) 10 mg Tab Take 10 mg by mouth 2 (two) times daily as needed.    fluoxetine (PROZAC) 20 MG capsule Take 20 mg by mouth once daily.    hydrocodone-acetaminophen (HYCET) solution 2.5-108 mg/5mL     methylphenidate (CONCERTA) 54 MG CR tablet Take 54 mg by mouth every morning.      mupirocin (BACTROBAN) 2 % ointment Apply to affected area 3 times daily    sulfamethoxazole-trimethoprim 800-160mg (BACTRIM DS) 800-160 mg Tab Take 1 tablet by mouth 2 (two) times daily.    VYVANSE 50 mg capsule 50 mg.     Psychotherapeutics (From admission, onward)    None        Review of Systems   Constitutional: Negative for fatigue and fever.   Respiratory: Negative for chest tightness and shortness of breath.    Cardiovascular: Negative for chest pain.   Gastrointestinal: Negative for nausea and vomiting.   Genitourinary: Negative for dysuria, frequency and urgency.   Neurological: Negative for seizures and headaches.   Psychiatric/Behavioral: Negative for behavioral problems, self-injury and suicidal ideas.     Strengths and Liabilities: Strength:  "Patient accepts guidance/feedback, Strength: Patient is expressive/articulate., Strength: Patient is intelligent., Strength: Patient is motivated for change., Strength: Patient has positive support network., Strength: Patient has reasonable judgment.    Objective:     Vital Signs (Most Recent):  Temp: 98.9 °F (37.2 °C) (11/05/19 1742)  Pulse: 100 (11/05/19 1742)  Resp: 18 (11/05/19 1742)  BP: (!) 145/66 (11/05/19 2145)  SpO2: 98 % (11/05/19 1742) Vital Signs (24h Range):  Temp:  [98.9 °F (37.2 °C)] 98.9 °F (37.2 °C)  Pulse:  [100] 100  Resp:  [18] 18  SpO2:  [98 %] 98 %  BP: (130-145)/(66-77) 145/66     Height: 5' 10" (177.8 cm)  Weight: 88.5 kg (195 lb)  Body mass index is 27.98 kg/m².    No intake or output data in the 24 hours ending 11/05/19 2217    Physical Exam   Psychiatric:   Mental Status Exam:  Appearance: fair hygiene and grooming, casually dressed, NAD, appears stated age   Behavior/Cooperation: calm, cooperative  Language: fluent english  Speech: normal tone, normal rate, normal pitch, normal volume  Mood: "not good"  Affect: full, reactive, appropriate  Thought Process: linear, logical, goal-directed  Thought Content:  denies SI/HI/paranoia/delusions; no objective evidence of paranoia or delusions  Perception: denies AVH; no objective evidence of AVH   Orientation: grossly intact  Memory: intact to conversation  Attention Span/Concentration: intact to conversation  Fund of Knowledge: appropriate for education level  Insight: good  Judgment: good        Significant Labs:   Last 24 Hours:   Recent Lab Results       11/05/19  1841 11/05/19  1834        Benzodiazepines Negative       Methadone metabolites Negative       Phencyclidine Negative       Acetaminophen (Tylenol), Serum   <3.0  Comment:  Toxic Levels:  Adults (4 hr post-ingestion).........>150 ug/mL  Adults (12 hr post-ingestion)........>40 ug/mL  Peds (2 hr post-ingestion, liquid)...>225 ug/mL       Albumin   4.1     Alcohol, Medical, Serum   " <10     Alkaline Phosphatase   66     ALT   10     Amphetamine Screen, Ur Negative       Anion Gap   7     Appearance, UA Cloudy       AST   14     Bacteria, UA Few       Barbiturate Screen, Ur Negative       Baso #   0.04     Basophil%   0.4     Bilirubin (UA) Negative       BILIRUBIN TOTAL   0.2  Comment:  For infants and newborns, interpretation of results should be based  on gestational age, weight and in agreement with clinical  observations.  Premature Infant recommended reference ranges:  Up to 24 hours.............<8.0 mg/dL  Up to 48 hours............<12.0 mg/dL  3-5 days..................<15.0 mg/dL  6-29 days.................<15.0 mg/dL       BUN, Bld   8     Calcium   9.3     Chloride   106     CO2   24     Cocaine (Metab.) Negative       Color, UA Yellow       Creatinine   0.8     Creatinine, Random Ur 109.0  Comment:  The random urine reference ranges provided were established   for 24 hour urine collections.  No reference ranges exist for  random urine specimens.  Correlate clinically.         Differential Method   Automated     eGFR if    >60.0     eGFR if non    >60.0  Comment:  Calculation used to obtain the estimated glomerular filtration  rate (eGFR) is the CKD-EPI equation.        Eos #   0.4     Eosinophil%   3.6     Glucose   105     Glucose, UA Negative       Gran # (ANC)   5.2     Gran%   52.9     Hematocrit   45.3     Hemoglobin   14.4     Immature Grans (Abs)   0.02  Comment:  Mild elevation in immature granulocytes is non specific and   can be seen in a variety of conditions including stress response,   acute inflammation, trauma and pregnancy. Correlation with other   laboratory and clinical findings is essential.       Immature Granulocytes   0.2     Ketones, UA Negative       Leukocytes, UA Trace       Lymph #   3.3     Lymph%   34.3     MCH   28.6     MCHC   31.8     MCV   90     Microscopic Comment SEE COMMENT  Comment:  Other formed elements not  mentioned in the report are not   present in the microscopic examination.          Mono #   0.8     Mono%   8.6     MPV   10.0     NITRITE UA Negative       nRBC   0     Occult Blood UA Negative       Opiate Scrn, Ur Negative       pH, UA 7.0       Platelets   254     Potassium   4.0     Preg Test, Ur Negative       PROTEIN TOTAL   7.4     Protein, UA Negative  Comment:  Recommend a 24 hour urine protein or a urine   protein/creatinine ratio if globulin induced proteinuria is  clinically suspected.          Acceptable Yes       RBC   5.03     RDW   12.9     Sodium   137     Specific Gravity, UA 1.015       Specimen UA Urine, Clean Catch       Squam Epithel, UA 1       Marijuana (THC) Metabolite Negative       Toxicology Information SEE COMMENT  Comment:  This screen includes the following classes of drugs at the   listed cut-off:  Benzodiazepines                  200 ng/ml  Methadone                        300 ng/ml  Cocaine metabolite               300 ng/ml  Opiates                          300 ng/ml  Barbiturates                     200 ng/ml  Amphetamines                    1000 ng/ml  Marijuana metabs (THC)            50 ng/ml  Phencyclidine (PCP)               25 ng/ml  High concentrations of Diphenhydramine may cross-react with  Phencyclidine PCP screening immunoassay giving a false   positive result.  High concentrations of Methylenedioxymethamphetamine (MDMA aka  Ectasy) and other structurally similar compounds may cross-   react with the Amphetamine/Methamphetamine screening   immunoassay giving a false positive result.  A metabolite of the anti-HIV drug Sustiva () may cause  false positive results in the Marijuana metabolite (THC)   screening assay.  Note: This exception list includes only more common   interferants in toxicology screen testing.  Because of many   cross-reactantspositive results on toxicology drug screens   should be confirmed whenever results do not correlate with    clinical presentation.  This report is intended for use in clinical monitoring and  management of patients. It is not intended for use in   employment related drug testing.  Because of any cross-reactants, positive results on toxicology  drug screens should be confirmed whenever results do not  correlate with clinical presentation.  Presumptive positive results are unconfirmed and may be used   only for medical purposes.  Assay Intended Use: This assay provides only a preliminary analytical  test result. A more specific alternate chemical method must be used  to obtain a confirmed analytical result. Gas chromatography/mass  spectrometry (GS/MS)is the preferred confirmatory method. Clinical  consideration and professional judgement should be applied to any   drug of abuse test result, particularly when preliminary results  are used.         TSH   1.085     WBC, UA 4       WBC   9.75           Significant Imaging: None

## 2019-11-15 ENCOUNTER — HOSPITAL ENCOUNTER (EMERGENCY)
Facility: HOSPITAL | Age: 24
Discharge: HOME | End: 2019-11-15
Attending: EMERGENCY MEDICINE
Payer: COMMERCIAL

## 2019-11-15 VITALS
SYSTOLIC BLOOD PRESSURE: 110 MMHG | HEART RATE: 100 BPM | BODY MASS INDEX: 26.48 KG/M2 | HEIGHT: 70 IN | DIASTOLIC BLOOD PRESSURE: 56 MMHG | TEMPERATURE: 100 F | RESPIRATION RATE: 18 BRPM | OXYGEN SATURATION: 97 % | WEIGHT: 185 LBS

## 2019-11-15 DIAGNOSIS — J11.1 INFLUENZA: Primary | ICD-10-CM

## 2019-11-15 LAB
ALBUMIN SERPL BCP-MCNC: 4.3 G/DL (ref 3.5–5.2)
ALP SERPL-CCNC: 64 U/L (ref 55–135)
ALT SERPL W/O P-5'-P-CCNC: 14 U/L (ref 10–44)
ANION GAP SERPL CALC-SCNC: 12 MMOL/L (ref 8–16)
AST SERPL-CCNC: 19 U/L (ref 10–40)
BASOPHILS # BLD AUTO: 0.02 K/UL (ref 0–0.2)
BASOPHILS NFR BLD: 0.2 % (ref 0–1.9)
BILIRUB SERPL-MCNC: 0.6 MG/DL (ref 0.1–1)
BUN SERPL-MCNC: 8 MG/DL (ref 6–20)
CALCIUM SERPL-MCNC: 9.2 MG/DL (ref 8.7–10.5)
CHLORIDE SERPL-SCNC: 104 MMOL/L (ref 95–110)
CK SERPL-CCNC: 94 U/L (ref 20–180)
CO2 SERPL-SCNC: 20 MMOL/L (ref 23–29)
CREAT SERPL-MCNC: 0.7 MG/DL (ref 0.5–1.4)
CTP QC/QA: YES
DIFFERENTIAL METHOD: ABNORMAL
EOSINOPHIL # BLD AUTO: 0 K/UL (ref 0–0.5)
EOSINOPHIL NFR BLD: 0.2 % (ref 0–8)
ERYTHROCYTE [DISTWIDTH] IN BLOOD BY AUTOMATED COUNT: 12.8 % (ref 11.5–14.5)
EST. GFR  (AFRICAN AMERICAN): >60 ML/MIN/1.73 M^2
EST. GFR  (NON AFRICAN AMERICAN): >60 ML/MIN/1.73 M^2
GLUCOSE SERPL-MCNC: 100 MG/DL (ref 70–110)
HCG INTACT+B SERPL-ACNC: <1.2 MIU/ML
HCT VFR BLD AUTO: 40.2 % (ref 37–48.5)
HGB BLD-MCNC: 13.5 G/DL (ref 12–16)
IMM GRANULOCYTES # BLD AUTO: 0.04 K/UL (ref 0–0.04)
IMM GRANULOCYTES NFR BLD AUTO: 0.4 % (ref 0–0.5)
LYMPHOCYTES # BLD AUTO: 0.3 K/UL (ref 1–4.8)
LYMPHOCYTES NFR BLD: 3.5 % (ref 18–48)
MCH RBC QN AUTO: 28.9 PG (ref 27–31)
MCHC RBC AUTO-ENTMCNC: 33.6 G/DL (ref 32–36)
MCV RBC AUTO: 86 FL (ref 82–98)
MONOCYTES # BLD AUTO: 0.9 K/UL (ref 0.3–1)
MONOCYTES NFR BLD: 9.4 % (ref 4–15)
NEUTROPHILS # BLD AUTO: 8.4 K/UL (ref 1.8–7.7)
NEUTROPHILS NFR BLD: 86.3 % (ref 38–73)
NRBC BLD-RTO: 0 /100 WBC
PLATELET # BLD AUTO: 227 K/UL (ref 150–350)
PMV BLD AUTO: 9.5 FL (ref 9.2–12.9)
POC MOLECULAR INFLUENZA A AGN: POSITIVE
POC MOLECULAR INFLUENZA B AGN: NEGATIVE
POTASSIUM SERPL-SCNC: 3.7 MMOL/L (ref 3.5–5.1)
PROT SERPL-MCNC: 7.6 G/DL (ref 6–8.4)
RBC # BLD AUTO: 4.67 M/UL (ref 4–5.4)
SODIUM SERPL-SCNC: 136 MMOL/L (ref 136–145)
WBC # BLD AUTO: 9.76 K/UL (ref 3.9–12.7)

## 2019-11-15 PROCEDURE — 63600175 PHARM REV CODE 636 W HCPCS: Performed by: EMERGENCY MEDICINE

## 2019-11-15 PROCEDURE — 99284 EMERGENCY DEPT VISIT MOD MDM: CPT | Mod: 25

## 2019-11-15 PROCEDURE — 85025 COMPLETE CBC W/AUTO DIFF WBC: CPT

## 2019-11-15 PROCEDURE — 80053 COMPREHEN METABOLIC PANEL: CPT

## 2019-11-15 PROCEDURE — 96374 THER/PROPH/DIAG INJ IV PUSH: CPT

## 2019-11-15 PROCEDURE — 99284 EMERGENCY DEPT VISIT MOD MDM: CPT | Mod: ,,, | Performed by: EMERGENCY MEDICINE

## 2019-11-15 PROCEDURE — 99284 PR EMERGENCY DEPT VISIT,LEVEL IV: ICD-10-PCS | Mod: ,,, | Performed by: EMERGENCY MEDICINE

## 2019-11-15 PROCEDURE — 87502 INFLUENZA DNA AMP PROBE: CPT

## 2019-11-15 PROCEDURE — 84702 CHORIONIC GONADOTROPIN TEST: CPT

## 2019-11-15 PROCEDURE — 25000003 PHARM REV CODE 250: Performed by: EMERGENCY MEDICINE

## 2019-11-15 PROCEDURE — 96361 HYDRATE IV INFUSION ADD-ON: CPT

## 2019-11-15 PROCEDURE — 82550 ASSAY OF CK (CPK): CPT

## 2019-11-15 RX ORDER — OSELTAMIVIR PHOSPHATE 75 MG/1
75 CAPSULE ORAL 2 TIMES DAILY
Qty: 9 CAPSULE | Refills: 0 | Status: SHIPPED | OUTPATIENT
Start: 2019-11-15 | End: 2019-11-20

## 2019-11-15 RX ORDER — ACETAMINOPHEN 325 MG/1
650 TABLET ORAL EVERY 6 HOURS PRN
Qty: 30 TABLET | Refills: 0 | Status: SHIPPED | OUTPATIENT
Start: 2019-11-15 | End: 2021-07-13

## 2019-11-15 RX ORDER — KETOROLAC TROMETHAMINE 30 MG/ML
10 INJECTION, SOLUTION INTRAMUSCULAR; INTRAVENOUS
Status: COMPLETED | OUTPATIENT
Start: 2019-11-15 | End: 2019-11-15

## 2019-11-15 RX ORDER — ACETAMINOPHEN 500 MG
1000 TABLET ORAL
Status: COMPLETED | OUTPATIENT
Start: 2019-11-15 | End: 2019-11-15

## 2019-11-15 RX ORDER — OSELTAMIVIR PHOSPHATE 75 MG/1
75 CAPSULE ORAL
Status: COMPLETED | OUTPATIENT
Start: 2019-11-15 | End: 2019-11-15

## 2019-11-15 RX ORDER — IBUPROFEN 400 MG/1
400 TABLET ORAL EVERY 6 HOURS PRN
Qty: 20 TABLET | Refills: 0 | Status: SHIPPED | OUTPATIENT
Start: 2019-11-15 | End: 2021-07-13

## 2019-11-15 RX ADMIN — OSELTAMIVIR PHOSPHATE 75 MG: 75 CAPSULE ORAL at 12:11

## 2019-11-15 RX ADMIN — SODIUM CHLORIDE 1000 ML: 0.9 INJECTION, SOLUTION INTRAVENOUS at 12:11

## 2019-11-15 RX ADMIN — KETOROLAC TROMETHAMINE 10 MG: 30 INJECTION, SOLUTION INTRAMUSCULAR; INTRAVENOUS at 12:11

## 2019-11-15 RX ADMIN — ACETAMINOPHEN 1000 MG: 500 TABLET ORAL at 02:11

## 2019-11-15 NOTE — ED PROVIDER NOTES
Encounter Date: 11/15/2019       History     Chief Complaint   Patient presents with    Generalized Body Aches     shakes, vomiting     HPI   24-year-old female with past medical history of depression, anxiety, ADD, comes in with 1 day of body aches, chills, malaise.  She endorses bilateral leg cramping, lower back cramping and general malaise.  He reports feeling well yesterday.  She denies runny nose, sore throat, cough.  She has no focal chest pain, shortness of breath, abdominal pain. She denies urinary symptoms. She denies sick contacts or recent travel.   She describes the cramps/aches as severe, diffuse, intermittent but severe.   No measured fevers at home.    She denies drug use, and specifically denies IV drug use.    Review of patient's allergies indicates:  No Known Allergies  Past Medical History:   Diagnosis Date    ADD (attention deficit disorder with hyperactivity)     Depression     History of psychiatric care     Psychiatric exam     Psychiatric problem     Self-injurious behavior     Suicide attempt     Therapy      Past Surgical History:   Procedure Laterality Date    APPENDECTOMY  January 2014     Family History   Problem Relation Age of Onset    Depression Mother      Social History     Tobacco Use    Smoking status: Never Smoker    Smokeless tobacco: Never Used   Substance Use Topics    Alcohol use: Yes     Comment: socially    Drug use: No     Review of Systems   Constitutional:  Positive Fever, positive Chills,   Eyes: No Vision Changes  ENT/Mouth: No sore throat, No rhinorrhea  Cardiovascular:  No Chest Pain  Respiratory:  No Cough, No SOB  Gastrointestinal:  No Nausea, No Vomiting, No Diarrhea, No abdo pain.  Genitourinary:  No  pain, No dysuria   Musculoskeletal:  Positive Back Pain, positive body aches, No Neck Pain, No recent trauma.  Skin:  No skin Lesions  Neuro:  No Weakness, No Dizziness, No Headache        Physical Exam     Initial Vitals [11/15/19 1116]   BP Pulse  Resp Temp SpO2   (!) 119/59 (!) 112 18 (!) 101.2 °F (38.4 °C) 99 %      MAP       --         Physical Exam   Physical Exam:  CONSTITUTIONAL: Well developed, well nourished, appears very uncomfortable, tearful.  HENT: Normocephalic, atraumatic, oropharynx is unremarkable    EYES: Sclerae anicteric   NECK: Supple, no thyroid enlargement  CARDIOVASCULAR: Regular rate and rhythm without any murmurs, gallops, rubs.  RESPIRATORY: Speaking in full sentences. Breathing comfortably. Auscultation of the lungs revealed normal breath sounds b/l, no wheezing, no rales, no rhonchi.  ABDOMEN: Soft and nontender, no masses, no rebound or guarding   NEUROLOGIC: Alert, interacting normally. No facial droop.   MSK: Moving all four extremities. Lower extremity compartments soft.  There is some mild-to-moderate diffuse lower back tenderness to palpation with no step-offs, bruising, erythema.  Skin: Warm and dry. No visible rash on exposed areas of skin.    Psych: Mood and affect normal.       ED Course   Procedures  Labs Reviewed   CBC W/ AUTO DIFFERENTIAL - Abnormal; Notable for the following components:       Result Value    Gran # (ANC) 8.4 (*)     Lymph # 0.3 (*)     Gran% 86.3 (*)     Lymph% 3.5 (*)     All other components within normal limits   COMPREHENSIVE METABOLIC PANEL - Abnormal; Notable for the following components:    CO2 20 (*)     All other components within normal limits   POCT INFLUENZA A/B MOLECULAR - Abnormal; Notable for the following components:    POC Molecular Influenza A Ag Positive (*)     All other components within normal limits   HCG, QUANTITATIVE, PREGNANCY   CK          Imaging Results    None          Medical Decision Making:   Initial Assessment:   24-year-old female with past medical history as noted coming in with lower back pain, body aches, malaise.  On exam she is initially very uncomfortable, tearful.    Flu swab is positive, can explain patient's symptoms body aches, malaise.  Given intensity of  symptoms IV placed given Toradol and IV fluids with significant improvement.  Labs obtained they are unremarkable.  Repeat vitals as noted.  Patient significantly improved in ED asking for food eating now at a 3/10 pain.  Will get Tylenol additionally.    Discussion had with the family and patient regarding Tamiflu risk and benefits.  Patient elects to take Tamiflu given intensity of her flu symptoms. Family will monitor for GI and neuropsychiatric side effects.    Discharge with supportive care, warnings that she is contagious until her symptoms resolve and should avoid crowded areas or contact with school/work colleagues without a mask.     PMD follow-up if her symptoms are not completely resolved in 5 days.  ED return precautions for trouble breathing repeated high fevers worsening pain, or any other new, worsening or worrisome symptoms.    Will discharge with Tylenol, ibuprofen, Tamiflu.    MDM Complexity Points:   Problem Points:  1.New problem, with no additional ED work-up planned (maximum of 1) - body aches, malaise, influenza    Data Points:  Review or order clinical lab tests, Decision to obtain old records (in the EHR) and Obtaining history from Someone else other than the patient. - part of history obtained from mother.                                     Clinical Impression:   Influenza                        Onel Cool MD  11/15/19 0808

## 2019-11-15 NOTE — DISCHARGE INSTRUCTIONS
Your workup was significant for positive flu.  Your labs were otherwise unremarkable.    Please take Tylenol and/or ibuprofen for symptom control.    You were prescribed 5 days of Tamiflu to treat the flu.    If you develop worsening pain, high fevers despite above medications, trouble breathing, feel like going to pass out, or any other new, worsening or worrisome symptoms please return to the emergency department for re-evaluation.    Otherwise please follow-up with your primary care doctor in 5 days if your symptoms are not completely resolved.

## 2019-11-15 NOTE — ED NOTES
Patient identifiers verified and correct for Koki Ronquillo   LOC: The patient is awake, alert and aware of environment with an appropriate affect, the patient is oriented x 3 and speaking appropriately.   APPEARANCE: Patient appears comfortable and in no acute distress, patient is clean and well groomed.  SKIN: The skin is warm and dry, color consistent with ethnicity, patient has normal skin turgor and moist mucus membranes, skin intact, no breakdown or bruising noted.   MUSCULOSKELETAL: Patient moving all extremities spontaneously, no swelling noted. Pt reports pain the entire back x1 day  RESPIRATORY: Airway is open and patent, respirations are spontaneous, patient has a normal effort and rate, no accessory muscle use noted, pt placed on continuous pulse ox with O2 sats noted at 97% on room air.  CARDIAC: Pt placed on cardiac monitor. Patient has a tachy rate and regular rhythm, no edema noted, capillary refill < 3 seconds.   GASTRO: Soft and non tender to palpation, no distention noted, normoactive bowel sounds present in all four quadrants. Pt states bowel movements have been regular.  : Pt denies any pain or frequency with urination.  NEURO: Pt opens eyes spontaneously, behavior appropriate to situation, follows commands, facial expression symmetrical, bilateral hand grasp equal and even, purposeful motor response noted, normal sensation in all extremities when touched with a finger.

## 2020-02-04 ENCOUNTER — OFFICE VISIT (OUTPATIENT)
Dept: URGENT CARE | Facility: CLINIC | Age: 25
End: 2020-02-04
Payer: COMMERCIAL

## 2020-02-04 VITALS
RESPIRATION RATE: 12 BRPM | WEIGHT: 185 LBS | BODY MASS INDEX: 26.48 KG/M2 | HEIGHT: 70 IN | SYSTOLIC BLOOD PRESSURE: 130 MMHG | OXYGEN SATURATION: 98 % | TEMPERATURE: 98 F | DIASTOLIC BLOOD PRESSURE: 83 MMHG | HEART RATE: 100 BPM

## 2020-02-04 DIAGNOSIS — L08.9 INFECTED SEBACEOUS CYST OF SKIN: Primary | ICD-10-CM

## 2020-02-04 DIAGNOSIS — L72.3 INFECTED SEBACEOUS CYST OF SKIN: Primary | ICD-10-CM

## 2020-02-04 PROCEDURE — 99214 OFFICE O/P EST MOD 30 MIN: CPT | Mod: S$GLB,,, | Performed by: NURSE PRACTITIONER

## 2020-02-04 PROCEDURE — 99214 PR OFFICE/OUTPT VISIT, EST, LEVL IV, 30-39 MIN: ICD-10-PCS | Mod: S$GLB,,, | Performed by: NURSE PRACTITIONER

## 2020-02-04 RX ORDER — MUPIROCIN 20 MG/G
OINTMENT TOPICAL 3 TIMES DAILY
Qty: 30 G | Refills: 0 | Status: SHIPPED | OUTPATIENT
Start: 2020-02-04 | End: 2021-07-13

## 2020-02-04 RX ORDER — SULFAMETHOXAZOLE AND TRIMETHOPRIM 800; 160 MG/1; MG/1
1 TABLET ORAL 2 TIMES DAILY
Qty: 14 TABLET | Refills: 0 | Status: SHIPPED | OUTPATIENT
Start: 2020-02-04 | End: 2021-07-13

## 2020-02-04 NOTE — PROGRESS NOTES
"Subjective:       Patient ID: Koki Solano is a 24 y.o. female.    Vitals:  height is 5' 10" (1.778 m) and weight is 83.9 kg (185 lb). Her temperature is 98.1 °F (36.7 °C). Her blood pressure is 130/83 and her pulse is 100. Her respiration is 12 and oxygen saturation is 98%.     Chief Complaint: Cellulitis    Pt here today for c/o infected cyst to posterior neck. Pt states she had the cyst drained about 1.5 years ago, and has now become infected again. Denies fever or chills.    Cyst   This is a recurrent problem. Episode onset: 1 month. The problem occurs constantly. The problem has been gradually worsening. Pertinent negatives include no abdominal pain, anorexia, arthralgias, change in bowel habit, chest pain, chills, congestion, coughing, diaphoresis, fatigue, fever, headaches, joint swelling, myalgias, nausea, neck pain, numbness, rash, sore throat, swollen glands, urinary symptoms, vertigo, visual change, vomiting or weakness. She has tried nothing for the symptoms.       Constitution: Negative for chills, sweating, fatigue and fever.   HENT: Negative for congestion and sore throat.    Neck: Negative for neck pain.   Cardiovascular: Negative for chest pain.   Respiratory: Negative for cough.    Gastrointestinal: Negative for abdominal pain, nausea and vomiting.   Musculoskeletal: Negative for joint pain, joint swelling and muscle ache.   Skin: Positive for color change and abscess. Negative for rash and erythema.   Neurological: Negative for history of vertigo, headaches and numbness.       Objective:        Physical Exam   Constitutional: She is oriented to person, place, and time. She appears well-developed and well-nourished.  Non-toxic appearance. She does not have a sickly appearance. She does not appear ill. No distress.       HENT:   Head: Normocephalic and atraumatic. Head is without abrasion, without contusion and without laceration.   Right Ear: External ear normal.   Left Ear: External ear normal. " "  Nose: Nose normal.   Mouth/Throat: Oropharynx is clear and moist and mucous membranes are normal.   Eyes: Pupils are equal, round, and reactive to light. Conjunctivae, EOM and lids are normal.   Neck: Trachea normal, full passive range of motion without pain and phonation normal. Neck supple.   Cardiovascular: Normal rate, regular rhythm and normal heart sounds.   Pulmonary/Chest: Effort normal and breath sounds normal. No stridor. No respiratory distress.   Musculoskeletal: Normal range of motion.   Neurological: She is alert and oriented to person, place, and time.   Skin: Skin is warm, dry, intact, no rash and abscessed (infected cyst). Capillary refill takes less than 2 seconds. abrasion, burn, bruising, erythema and ecchymosis  Psychiatric: She has a normal mood and affect. Her speech is normal and behavior is normal. Judgment and thought content normal. Cognition and memory are normal.   Nursing note and vitals reviewed.      Incision & Drainage  Date/Time: 2/4/2020 12:30 PM  Performed by: Sona Meek NP  Authorized by: Sona Meek NP     Time out: Immediately prior to procedure a "time out" was called to verify the correct patient, procedure, equipment, support staff and site/side marked as required.    Consent Done?:  Yes (Verbal)    Type:  Cyst  Body area:  Head/neck  Location details:  Neck  Local anesthetic: lidocaine 1% without epinephrine  Anesthetic total (ml):  2  Scalpel size:  11  Incision type:  Single straight  Complexity:  Simple  Drainage:  Pus, purulent, bloody and serosanguinous  Drainage amount:  Moderate  Wound treatment:  Drainage, incision, deloculation, expression of material and sebum removal (partial removal of cyst capsule)  Patient tolerance:  Patient tolerated the procedure well with no immediate complications      Assessment:       1. Infected sebaceous cyst of skin        Plan:         Infected sebaceous cyst of skin  -     mupirocin (BACTROBAN) 2 % ointment; Apply " topically 3 (three) times daily.  Dispense: 30 g; Refill: 0  -     Ambulatory referral/consult to Dermatology  -     sulfamethoxazole-trimethoprim 800-160mg (BACTRIM DS) 800-160 mg Tab; Take 1 tablet by mouth 2 (two) times daily.  Dispense: 14 tablet; Refill: 0    Discussed diagnosis and treatment plan today. Instructed to follow up with PCP or go to ER if symptoms fail to improve or worsen. Pt verbalizes understanding.       Patient Instructions   PLEASE READ YOUR DISCHARGE INSTRUCTIONS ENTIRELY AS IT CONTAINS IMPORTANT INFORMATION.    Please return here or go to the Emergency Department for any concerns or worsening of condition.    If you were prescribed antibiotics, please take them to completion.    If not allergic, please take over the counter Tylenol (Acetaminophen) and/or Motrin (Ibuprofen) as directed for control of pain and/or fever.  Please follow up with your primary care doctor or specialist as needed.    Take a shower for personal hygiene, no bath.  Do not soak wound unless specifically instructed.  If packing in place, return to clinic for wound check as instructed.  If packing falls out before wound check, call clinic for instruction.  If this is a recurrent issue, use hibiclens three times a week as body wash to help prevent future abscess(es), let stay on skin for 5 minutes before rinsing.off.  If placed on antibiotics, complete them.  The abscess may drain for a day or two, keep covered while out.     If you  smoke, please stop smoking.     A referral to Dermatology has been placed for you.  Please call [phone] to make an appt    Please return or see your primary care doctor if you develop new or worsening symptoms.     Please arrange follow up with your primary medical clinic as soon as possible. You must understand that you've received an Urgent Care treatment only and that you may be released before all of your medical problems are known or treated. You, the patient, will arrange for  follow up as instructed. If your symptoms worsen or fail to improve you should go to the Emergency Room.          Epidermoid Cyst (Sebaceous Cyst), Infected (Antibiotic Treatment)  You have an epidermoid cyst. This is a small, painless lump under your skin. An epidermoid cyst (often called a sebaceous cyst, epidermal cyst, or epidermal inclusion cyst) is a term most often used for 2 similar types of cysts:  · Epidermoid cysts. These cysts form slowly under the skin. They can be found on most parts of the body. But they are most often found on areas with more hair such as the scalp, face, upper back, and genitals.  · Pilar cysts. These are similar to epidermoid cysts. But they start from a different part of the hair follicle. They are more likely to be on the scalp.  Here are some general facts about these cysts:  · A cyst is a sac filled with material that is often cheesy, fatty, oily, or stringy. The material inside can be thick. Or it can be a liquid.  · You can usually move the cyst slightly if you try.  · The cysts can be smaller than a pea or as large as a few inches.  · The cysts are usually not painful, unless they become inflamed or infected.  · The area around the cyst may smell bad. If the cyst breaks open, the material inside it often smells bad as well.  Your cyst became infected and your healthcare provider wanted to treat it with antibiotics. If the antibiotics dont clear up the infection, the cyst will need to be drained by making a small cut (incision). Local anesthesia will be used to numb the area.  Home care  · Resist the temptation to squeeze or pop the cyst, stick a needle in it, or cut it open. This often leads to a worsening infection and scarring.  · Take the antibiotic as directed until it is all used up.  · Soak the affected area in hot water or apply a hot pack (a thin, clean towel soaked in hot water) for 20 minutes at a time. Do this 3 to 4 times a day.  · Apply antibiotic cream or  ointment 3 times a day.  · You may use over-the-counter pain medicine to control pain, unless another medicine was given. If you have chronic liver or kidney disease or ever had a stomach ulcer or GI bleeding, talk with your healthcare provider before using these medicines.  Prevention  Once this infection has healed, reduce the risk of future infections by:  · Keeping the cyst area clean by bathing or showering daily  · Avoiding tight-fitting clothing in the cyst area  Follow-up care  Follow up with your healthcare provider, or as advised. If a gauze packing was put in your wound, it should be removed in a few days as advised by your healthcare provider. Check your wound every day for the signs listed below.  When to seek medical advice  Call your healthcare provider right away if any of these occur:  · Pus coming from the cyst  · Increasing redness around the wound  · Increasing local pain or swelling  · Fever of 100.4°F (38ºC) or higher, or as directed by your provider  Date Last Reviewed: 10/5/2016  © 5919-0449 [employer]. [address] [place] [address], [place] [address]. All rights reserved. This information is not intended as a substitute for professional medical care. Always follow your healthcare professional's instructions.

## 2020-02-04 NOTE — PATIENT INSTRUCTIONS
PLEASE READ YOUR DISCHARGE INSTRUCTIONS ENTIRELY AS IT CONTAINS IMPORTANT INFORMATION.    Please return here or go to the Emergency Department for any concerns or worsening of condition.    If you were prescribed antibiotics, please take them to completion.    If not allergic, please take over the counter Tylenol (Acetaminophen) and/or Motrin (Ibuprofen) as directed for control of pain and/or fever.  Please follow up with your primary care doctor or specialist as needed.    Take a shower for personal hygiene, no bath.  Do not soak wound unless specifically instructed.  If packing in place, return to clinic for wound check as instructed.  If packing falls out before wound check, call clinic for instruction.  If this is a recurrent issue, use hibiclens three times a week as body wash to help prevent future abscess(es), let stay on skin for 5 minutes before rinsing.off.  If placed on antibiotics, complete them.  The abscess may drain for a day or two, keep covered while out.     If you  smoke, please stop smoking.     A referral to Dermatology has been placed for you.  Please call [phone] to make an appt    Please return or see your primary care doctor if you develop new or worsening symptoms.     Please arrange follow up with your primary medical clinic as soon as possible. You must understand that you've received an Urgent Care treatment only and that you may be released before all of your medical problems are known or treated. You, the patient, will arrange for follow up as instructed. If your symptoms worsen or fail to improve you should go to the Emergency Room.          Epidermoid Cyst (Sebaceous Cyst), Infected (Antibiotic Treatment)  You have an epidermoid cyst. This is a small, painless lump under your skin. An epidermoid cyst (often called a sebaceous cyst, epidermal cyst, or epidermal inclusion cyst) is a term most often used for 2 similar types of cysts:  · Epidermoid cysts. These cysts form slowly  under the skin. They can be found on most parts of the body. But they are most often found on areas with more hair such as the scalp, face, upper back, and genitals.  · Pilar cysts. These are similar to epidermoid cysts. But they start from a different part of the hair follicle. They are more likely to be on the scalp.  Here are some general facts about these cysts:  · A cyst is a sac filled with material that is often cheesy, fatty, oily, or stringy. The material inside can be thick. Or it can be a liquid.  · You can usually move the cyst slightly if you try.  · The cysts can be smaller than a pea or as large as a few inches.  · The cysts are usually not painful, unless they become inflamed or infected.  · The area around the cyst may smell bad. If the cyst breaks open, the material inside it often smells bad as well.  Your cyst became infected and your healthcare provider wanted to treat it with antibiotics. If the antibiotics dont clear up the infection, the cyst will need to be drained by making a small cut (incision). Local anesthesia will be used to numb the area.  Home care  · Resist the temptation to squeeze or pop the cyst, stick a needle in it, or cut it open. This often leads to a worsening infection and scarring.  · Take the antibiotic as directed until it is all used up.  · Soak the affected area in hot water or apply a hot pack (a thin, clean towel soaked in hot water) for 20 minutes at a time. Do this 3 to 4 times a day.  · Apply antibiotic cream or ointment 3 times a day.  · You may use over-the-counter pain medicine to control pain, unless another medicine was given. If you have chronic liver or kidney disease or ever had a stomach ulcer or GI bleeding, talk with your healthcare provider before using these medicines.  Prevention  Once this infection has healed, reduce the risk of future infections by:  · Keeping the cyst area clean by bathing or showering daily  · Avoiding tight-fitting clothing in  the cyst area  Follow-up care  Follow up with your healthcare provider, or as advised. If a gauze packing was put in your wound, it should be removed in a few days as advised by your healthcare provider. Check your wound every day for the signs listed below.  When to seek medical advice  Call your healthcare provider right away if any of these occur:  · Pus coming from the cyst  · Increasing redness around the wound  · Increasing local pain or swelling  · Fever of 100.4°F (38ºC) or higher, or as directed by your provider  Date Last Reviewed: 10/5/2016  © 3686-0917 [employer]. [address] [place] [address], [place] [id number]. All rights reserved. This information is not intended as a substitute for professional medical care. Always follow your healthcare professional's instructions.

## 2020-06-18 ENCOUNTER — LAB VISIT (OUTPATIENT)
Dept: PRIMARY CARE CLINIC | Facility: OTHER | Age: 25
End: 2020-06-18
Payer: COMMERCIAL

## 2020-06-18 DIAGNOSIS — Z11.59 ENCOUNTER FOR SCREENING FOR OTHER VIRAL DISEASES: ICD-10-CM

## 2020-06-18 PROCEDURE — U0003 INFECTIOUS AGENT DETECTION BY NUCLEIC ACID (DNA OR RNA); SEVERE ACUTE RESPIRATORY SYNDROME CORONAVIRUS 2 (SARS-COV-2) (CORONAVIRUS DISEASE [COVID-19]), AMPLIFIED PROBE TECHNIQUE, MAKING USE OF HIGH THROUGHPUT TECHNOLOGIES AS DESCRIBED BY CMS-2020-01-R: HCPCS

## 2020-06-21 LAB — SARS-COV-2 RNA RESP QL NAA+PROBE: NOT DETECTED

## 2020-07-08 ENCOUNTER — TELEPHONE (OUTPATIENT)
Dept: ORTHOPEDICS | Facility: CLINIC | Age: 25
End: 2020-07-08

## 2020-07-08 NOTE — TELEPHONE ENCOUNTER
I left 2 messages for the patient. I called her this morning to ask a few questions about her up coming appointment to see Trina.   I called again in the afternoon and left a message for her to call me about her appointment on tomorrow with Trina. We might have to reschedule her with someone else do to where she is having pain.

## 2020-07-13 DIAGNOSIS — M25.551 PAIN OF RIGHT HIP JOINT: Primary | ICD-10-CM

## 2020-07-14 ENCOUNTER — OFFICE VISIT (OUTPATIENT)
Dept: ORTHOPEDICS | Facility: CLINIC | Age: 25
End: 2020-07-14
Attending: ORTHOPAEDIC SURGERY
Payer: COMMERCIAL

## 2020-07-14 VITALS
DIASTOLIC BLOOD PRESSURE: 60 MMHG | HEART RATE: 83 BPM | SYSTOLIC BLOOD PRESSURE: 100 MMHG | OXYGEN SATURATION: 96 % | TEMPERATURE: 97 F | WEIGHT: 220.44 LBS | RESPIRATION RATE: 18 BRPM | BODY MASS INDEX: 31.56 KG/M2 | HEIGHT: 70 IN

## 2020-07-14 DIAGNOSIS — M25.551 PAIN OF RIGHT HIP JOINT: Primary | ICD-10-CM

## 2020-07-14 PROCEDURE — 99203 PR OFFICE/OUTPT VISIT, NEW, LEVL III, 30-44 MIN: ICD-10-PCS | Mod: S$GLB,,, | Performed by: ORTHOPAEDIC SURGERY

## 2020-07-14 PROCEDURE — 99999 PR PBB SHADOW E&M-EST. PATIENT-LVL V: ICD-10-PCS | Mod: PBBFAC,,, | Performed by: ORTHOPAEDIC SURGERY

## 2020-07-14 PROCEDURE — 3008F BODY MASS INDEX DOCD: CPT | Mod: CPTII,S$GLB,, | Performed by: ORTHOPAEDIC SURGERY

## 2020-07-14 PROCEDURE — 3008F PR BODY MASS INDEX (BMI) DOCUMENTED: ICD-10-PCS | Mod: CPTII,S$GLB,, | Performed by: ORTHOPAEDIC SURGERY

## 2020-07-14 PROCEDURE — 99203 OFFICE O/P NEW LOW 30 MIN: CPT | Mod: S$GLB,,, | Performed by: ORTHOPAEDIC SURGERY

## 2020-07-14 PROCEDURE — 99999 PR PBB SHADOW E&M-EST. PATIENT-LVL V: CPT | Mod: PBBFAC,,, | Performed by: ORTHOPAEDIC SURGERY

## 2020-07-14 RX ORDER — DICLOFENAC SODIUM 50 MG/1
50 TABLET, DELAYED RELEASE ORAL 2 TIMES DAILY
Qty: 60 TABLET | Refills: 0 | Status: SHIPPED | OUTPATIENT
Start: 2020-07-14 | End: 2020-07-14

## 2020-07-14 NOTE — PROGRESS NOTES
"Chief Complaint   Patient presents with    Right Hip - Pain       This patient was seen in consultation at the request of Dr. Shannan Puentes     Initial visit (07/14/2020): Koki Solano is a 24 y.o. female who presents today complaining of Pain of the Right Hip     Duration of symptoms:  About 6  months  Trauma or new activity: yes - had a fall with a large bruise on the right buttock about 1 year ago - pain did not start immediately after   Has noticed a "lump" over the right side and this is tender   No pain on left   Pain is constant with intermittent exacerbations   Aggravating factors: weight bearing, direct pressure   Relieving factors: rest   Radicular symptoms: no numbness, paresthesias   Associated symptoms:  swelling.    Prior treatment:  OTC NSAIDs , ice and heat without improvement in pain.     Pain does interfere with activities of daily living .    This is the extent of the patient's complaints at this time.      Occupation: Currently not working - graduated [employer] in May and has not been able to find a job bc of covid     Review of Systems   All other systems reviewed and are negative.        Review of patient's allergies indicates:  No Known Allergies      Current Outpatient Medications:     fluoxetine (PROZAC) 20 MG capsule, Take 20 mg by mouth once daily., Disp: , Rfl:     acetaminophen (TYLENOL) 325 MG tablet, Take 2 tablets (650 mg total) by mouth every 6 (six) hours as needed. (Patient not taking: Reported on 7/14/2020), Disp: 30 tablet, Rfl: 0    AMPHETAMINE SALT COMBO 20 MG tablet, , Disp: , Rfl:     amphetamine-dextroamphetamine (AMPHETAMINE-DEXTROAMPHETAMINE) 10 mg Tab, Take 10 mg by mouth 2 (two) times daily as needed. (Patient not taking: Reported on 7/14/2020), Disp: 60 tablet, Rfl: 0    hydrocodone-acetaminophen (HYCET) solution 2.5-108 mg/5mL, , Disp: , Rfl:     ibuprofen (ADVIL,MOTRIN) 400 MG tablet, Take 1 tablet (400 mg total) by mouth every 6 (six) hours as needed. " "(Patient not taking: Reported on 7/14/2020), Disp: 20 tablet, Rfl: 0    methylphenidate (CONCERTA) 54 MG CR tablet, Take 54 mg by mouth every morning.  , Disp: , Rfl:     mupirocin (BACTROBAN) 2 % ointment, Apply to affected area 3 times daily (Patient not taking: Reported on 7/14/2020), Disp: 22 g, Rfl: 1    mupirocin (BACTROBAN) 2 % ointment, Apply topically 3 (three) times daily. (Patient not taking: Reported on 7/14/2020), Disp: 30 g, Rfl: 0    sulfamethoxazole-trimethoprim 800-160mg (BACTRIM DS) 800-160 mg Tab, Take 1 tablet by mouth 2 (two) times daily. (Patient not taking: Reported on 7/14/2020), Disp: 20 tablet, Rfl: 0    sulfamethoxazole-trimethoprim 800-160mg (BACTRIM DS) 800-160 mg Tab, Take 1 tablet by mouth 2 (two) times daily. (Patient not taking: Reported on 7/14/2020), Disp: 14 tablet, Rfl: 0    VYVANSE 50 mg capsule, 50 mg., Disp: , Rfl: 0    Past Medical History:   Diagnosis Date    ADD (attention deficit disorder with hyperactivity)     Depression     History of psychiatric care     Psychiatric exam     Psychiatric problem     Self-injurious behavior     Suicide attempt     Therapy        Patient Active Problem List   Diagnosis    Major depressive disorder       Past Surgical History:   Procedure Laterality Date    APPENDECTOMY  January 2014       Social History     Tobacco Use    Smoking status: Never Smoker    Smokeless tobacco: Never Used   Substance Use Topics    Alcohol use: Yes     Comment: socially    Drug use: No       Family History   Problem Relation Age of Onset    Depression Mother        Physical Exam:   Vitals:    07/14/20 1306   BP: 100/60   Pulse: 83   Resp: 18   Temp: 97.3 °F (36.3 °C)   SpO2: 96%   Weight: 100 kg (220 lb 7.4 oz)   Height: 5' 10" (1.778 m)   PainSc:   3   PainLoc: Hip       General: Weight: 100 kg (220 lb 7.4 oz) Body mass index is 31.63 kg/m².   Patient is alert, awake and oriented to time, place and person. Mood and affect are appropriate.  " Patient does not appear to be in any distress, denies any constitutional symptoms and appears stated age.   HEENT:  Pupils are equal and round, sclera are not injected. External examination of ears and nose reveals no abnormalities. Cranial nerves II-X are grossly intact  Skin:  no rashes, abrasions or open wounds on the affected extremity   Resp:  No respiratory distress or audible wheezing   CV: 2+  pulses, all extremities warm and well perfused   Right Lower Extremity    Tender palpation over the PSIS/SI joint  Nontender over ischial tuberosity and greater trochanter  She does have a different contour of the right buttock compared to the left however there is no palpable mass  Ltsi s/s/sp/dp/t  + ehl/fhl/ta/gs  2+ DP     Imaging:  Two views of the right hip and two views of the sacrum and lumbar spine are negative for any bony abnormalities.  There is no evidence of a soft tissue or bony mass.    I personally reviewed and interpreted the patient's imaging obtained today in clinic     Assessment: 24 y.o. female with SI pain    I explained my diagnostic impression and the reasoning behind it in detail, using layman's terms.  Models and/or pictures were used to help in the explanation.    Plan:   - Diclofenac 50 mg PO BID x 2 weeks then PRN. Take with food. The patient was advised that NSAID-type medications have multiple very important potential side effects: gastrointestinal irritation including Gi bleed, cardiac effects and renal injuries. Instructions were given to take the medication with food and to stop for any GI upset. Call for vomiting, abdominal pain or black/bloody stools. Do not take with other NSAIDs such as ibuprofen or naproxen.  The patient expresses understanding of these issues and questions were answered.   - pain management referral   - X ray on the way out   - Return to clinic PRN     All questions were answered in detail. The patient is in full agreement with the treatment plan and will  proceed accordingly.    A note notifying Dr. Shannan Puentes of my findings was sent via the electronic medical record     This note was created by combination of typed  and M-Modal dictation. Transcription and phonetic errors may be present.  If there are any questions, please contact me.

## 2021-01-11 ENCOUNTER — OFFICE VISIT (OUTPATIENT)
Dept: DERMATOLOGY | Facility: CLINIC | Age: 26
End: 2021-01-11
Payer: COMMERCIAL

## 2021-01-11 DIAGNOSIS — L72.3 INFLAMED SEBACEOUS CYST: Primary | ICD-10-CM

## 2021-01-11 PROCEDURE — 10060 I&D ABSCESS SIMPLE/SINGLE: CPT | Mod: S$GLB,,, | Performed by: DERMATOLOGY

## 2021-01-11 PROCEDURE — 99203 PR OFFICE/OUTPT VISIT, NEW, LEVL III, 30-44 MIN: ICD-10-PCS | Mod: 25,S$GLB,, | Performed by: DERMATOLOGY

## 2021-01-11 PROCEDURE — 1125F PR PAIN SEVERITY QUANTIFIED, PAIN PRESENT: ICD-10-PCS | Mod: S$GLB,,, | Performed by: DERMATOLOGY

## 2021-01-11 PROCEDURE — 10060 PR DRAIN SKIN ABSCESS SIMPLE: ICD-10-PCS | Mod: S$GLB,,, | Performed by: DERMATOLOGY

## 2021-01-11 PROCEDURE — 99999 PR PBB SHADOW E&M-EST. PATIENT-LVL III: ICD-10-PCS | Mod: PBBFAC,,, | Performed by: DERMATOLOGY

## 2021-01-11 PROCEDURE — 87070 CULTURE OTHR SPECIMN AEROBIC: CPT

## 2021-01-11 PROCEDURE — 99203 OFFICE O/P NEW LOW 30 MIN: CPT | Mod: 25,S$GLB,, | Performed by: DERMATOLOGY

## 2021-01-11 PROCEDURE — 99999 PR PBB SHADOW E&M-EST. PATIENT-LVL III: CPT | Mod: PBBFAC,,, | Performed by: DERMATOLOGY

## 2021-01-11 PROCEDURE — 1125F AMNT PAIN NOTED PAIN PRSNT: CPT | Mod: S$GLB,,, | Performed by: DERMATOLOGY

## 2021-01-13 LAB — BACTERIA SPEC AEROBE CULT: NORMAL

## 2021-01-21 ENCOUNTER — CLINICAL SUPPORT (OUTPATIENT)
Dept: URGENT CARE | Facility: CLINIC | Age: 26
End: 2021-01-21
Payer: COMMERCIAL

## 2021-01-21 DIAGNOSIS — Z11.59 SCREENING FOR VIRAL DISEASE: Primary | ICD-10-CM

## 2021-01-21 LAB
CTP QC/QA: YES
SARS-COV-2 RDRP RESP QL NAA+PROBE: NEGATIVE

## 2021-01-21 PROCEDURE — U0002: ICD-10-PCS | Mod: QW,S$GLB,, | Performed by: PHYSICIAN ASSISTANT

## 2021-01-21 PROCEDURE — U0002 COVID-19 LAB TEST NON-CDC: HCPCS | Mod: QW,S$GLB,, | Performed by: PHYSICIAN ASSISTANT

## 2021-04-15 ENCOUNTER — PATIENT MESSAGE (OUTPATIENT)
Dept: RESEARCH | Facility: HOSPITAL | Age: 26
End: 2021-04-15

## 2021-06-25 ENCOUNTER — OFFICE VISIT (OUTPATIENT)
Dept: URGENT CARE | Facility: CLINIC | Age: 26
End: 2021-06-25
Payer: COMMERCIAL

## 2021-06-25 VITALS
OXYGEN SATURATION: 97 % | SYSTOLIC BLOOD PRESSURE: 133 MMHG | WEIGHT: 200 LBS | HEIGHT: 70 IN | HEART RATE: 76 BPM | DIASTOLIC BLOOD PRESSURE: 77 MMHG | TEMPERATURE: 98 F | RESPIRATION RATE: 16 BRPM | BODY MASS INDEX: 28.63 KG/M2

## 2021-06-25 DIAGNOSIS — J01.00 ACUTE MAXILLARY SINUSITIS, RECURRENCE NOT SPECIFIED: Primary | ICD-10-CM

## 2021-06-25 PROCEDURE — 99214 PR OFFICE/OUTPT VISIT, EST, LEVL IV, 30-39 MIN: ICD-10-PCS | Mod: 25,S$GLB,, | Performed by: NURSE PRACTITIONER

## 2021-06-25 PROCEDURE — 96372 PR INJECTION,THERAP/PROPH/DIAG2ST, IM OR SUBCUT: ICD-10-PCS | Mod: S$GLB,,, | Performed by: NURSE PRACTITIONER

## 2021-06-25 PROCEDURE — 96372 THER/PROPH/DIAG INJ SC/IM: CPT | Mod: S$GLB,,, | Performed by: NURSE PRACTITIONER

## 2021-06-25 PROCEDURE — 99214 OFFICE O/P EST MOD 30 MIN: CPT | Mod: 25,S$GLB,, | Performed by: NURSE PRACTITIONER

## 2021-06-25 PROCEDURE — 3008F PR BODY MASS INDEX (BMI) DOCUMENTED: ICD-10-PCS | Mod: CPTII,S$GLB,, | Performed by: NURSE PRACTITIONER

## 2021-06-25 PROCEDURE — 3008F BODY MASS INDEX DOCD: CPT | Mod: CPTII,S$GLB,, | Performed by: NURSE PRACTITIONER

## 2021-06-25 RX ORDER — LORATADINE 10 MG/1
10 TABLET ORAL DAILY
Qty: 30 TABLET | Refills: 2 | Status: SHIPPED | OUTPATIENT
Start: 2021-06-25 | End: 2021-09-28

## 2021-06-25 RX ORDER — DEXAMETHASONE SODIUM PHOSPHATE 100 MG/10ML
4 INJECTION INTRAMUSCULAR; INTRAVENOUS
Status: COMPLETED | OUTPATIENT
Start: 2021-06-25 | End: 2021-06-25

## 2021-06-25 RX ORDER — MOMETASONE FUROATE 50 UG/1
2 SPRAY, METERED NASAL DAILY
Qty: 1 EACH | Refills: 2 | Status: SHIPPED | OUTPATIENT
Start: 2021-06-25 | End: 2021-09-28

## 2021-06-25 RX ADMIN — DEXAMETHASONE SODIUM PHOSPHATE 4 MG: 100 INJECTION INTRAMUSCULAR; INTRAVENOUS at 01:06

## 2021-07-05 ENCOUNTER — HOSPITAL ENCOUNTER (EMERGENCY)
Facility: HOSPITAL | Age: 26
Discharge: HOME | End: 2021-07-05
Attending: EMERGENCY MEDICINE
Payer: COMMERCIAL

## 2021-07-05 VITALS
DIASTOLIC BLOOD PRESSURE: 69 MMHG | TEMPERATURE: 99 F | BODY MASS INDEX: 30.06 KG/M2 | SYSTOLIC BLOOD PRESSURE: 122 MMHG | HEIGHT: 70 IN | RESPIRATION RATE: 18 BRPM | OXYGEN SATURATION: 100 % | WEIGHT: 210 LBS | HEART RATE: 75 BPM

## 2021-07-05 DIAGNOSIS — M25.561 RIGHT KNEE PAIN: Primary | ICD-10-CM

## 2021-07-05 PROCEDURE — 63600175 PHARM REV CODE 636 W HCPCS: Performed by: PHYSICIAN ASSISTANT

## 2021-07-05 PROCEDURE — 99284 PR EMERGENCY DEPT VISIT,LEVEL IV: ICD-10-PCS | Mod: ,,, | Performed by: EMERGENCY MEDICINE

## 2021-07-05 PROCEDURE — 99284 EMERGENCY DEPT VISIT MOD MDM: CPT | Mod: ,,, | Performed by: EMERGENCY MEDICINE

## 2021-07-05 PROCEDURE — 99284 EMERGENCY DEPT VISIT MOD MDM: CPT

## 2021-07-05 PROCEDURE — 25000003 PHARM REV CODE 250: Performed by: PHYSICIAN ASSISTANT

## 2021-07-05 RX ORDER — NAPROXEN 500 MG/1
500 TABLET ORAL 2 TIMES DAILY WITH MEALS
Qty: 30 TABLET | Refills: 0 | Status: SHIPPED | OUTPATIENT
Start: 2021-07-05 | End: 2021-07-27 | Stop reason: ALTCHOICE

## 2021-07-05 RX ORDER — KETOROLAC TROMETHAMINE 30 MG/ML
15 INJECTION, SOLUTION INTRAMUSCULAR; INTRAVENOUS
Status: COMPLETED | OUTPATIENT
Start: 2021-07-05 | End: 2021-07-05

## 2021-07-05 RX ORDER — ACETAMINOPHEN 500 MG
1000 TABLET ORAL
Status: COMPLETED | OUTPATIENT
Start: 2021-07-05 | End: 2021-07-05

## 2021-07-05 RX ADMIN — KETOROLAC TROMETHAMINE 15 MG: 30 INJECTION, SOLUTION INTRAMUSCULAR; INTRAVENOUS at 03:07

## 2021-07-05 RX ADMIN — ACETAMINOPHEN 1000 MG: 500 TABLET ORAL at 03:07

## 2021-07-12 ENCOUNTER — HOSPITAL ENCOUNTER (OUTPATIENT)
Dept: RADIOLOGY | Facility: HOSPITAL | Age: 26
Discharge: HOME | End: 2021-07-12
Attending: PHYSICIAN ASSISTANT
Payer: COMMERCIAL

## 2021-07-12 ENCOUNTER — OFFICE VISIT (OUTPATIENT)
Dept: SPORTS MEDICINE | Facility: CLINIC | Age: 26
End: 2021-07-12
Payer: COMMERCIAL

## 2021-07-12 VITALS
SYSTOLIC BLOOD PRESSURE: 127 MMHG | HEART RATE: 87 BPM | WEIGHT: 231.5 LBS | DIASTOLIC BLOOD PRESSURE: 73 MMHG | HEIGHT: 70 IN | BODY MASS INDEX: 33.14 KG/M2

## 2021-07-12 DIAGNOSIS — M25.561 ACUTE PAIN OF RIGHT KNEE: Primary | ICD-10-CM

## 2021-07-12 DIAGNOSIS — M25.561 RIGHT KNEE PAIN: ICD-10-CM

## 2021-07-12 DIAGNOSIS — M25.561 RIGHT KNEE PAIN, UNSPECIFIED CHRONICITY: ICD-10-CM

## 2021-07-12 PROCEDURE — 99999 PR PBB SHADOW E&M-EST. PATIENT-LVL IV: ICD-10-PCS | Mod: PBBFAC,,, | Performed by: PHYSICIAN ASSISTANT

## 2021-07-12 PROCEDURE — 1125F AMNT PAIN NOTED PAIN PRSNT: CPT | Mod: S$GLB,,, | Performed by: PHYSICIAN ASSISTANT

## 2021-07-12 PROCEDURE — 3008F PR BODY MASS INDEX (BMI) DOCUMENTED: ICD-10-PCS | Mod: CPTII,S$GLB,, | Performed by: PHYSICIAN ASSISTANT

## 2021-07-12 PROCEDURE — 73564 X-RAY EXAM KNEE 4 OR MORE: CPT | Mod: 26,50,, | Performed by: RADIOLOGY

## 2021-07-12 PROCEDURE — 1125F PR PAIN SEVERITY QUANTIFIED, PAIN PRESENT: ICD-10-PCS | Mod: S$GLB,,, | Performed by: PHYSICIAN ASSISTANT

## 2021-07-12 PROCEDURE — 99213 PR OFFICE/OUTPT VISIT, EST, LEVL III, 20-29 MIN: ICD-10-PCS | Mod: S$GLB,,, | Performed by: PHYSICIAN ASSISTANT

## 2021-07-12 PROCEDURE — 73564 XR KNEE ORTHO BILAT WITH FLEXION: ICD-10-PCS | Mod: 26,50,, | Performed by: RADIOLOGY

## 2021-07-12 PROCEDURE — 99213 OFFICE O/P EST LOW 20 MIN: CPT | Mod: S$GLB,,, | Performed by: PHYSICIAN ASSISTANT

## 2021-07-12 PROCEDURE — 73564 X-RAY EXAM KNEE 4 OR MORE: CPT | Mod: TC,50

## 2021-07-12 PROCEDURE — 99999 PR PBB SHADOW E&M-EST. PATIENT-LVL IV: CPT | Mod: PBBFAC,,, | Performed by: PHYSICIAN ASSISTANT

## 2021-07-12 PROCEDURE — 3008F BODY MASS INDEX DOCD: CPT | Mod: CPTII,S$GLB,, | Performed by: PHYSICIAN ASSISTANT

## 2021-07-16 ENCOUNTER — OFFICE VISIT (OUTPATIENT)
Dept: URGENT CARE | Facility: CLINIC | Age: 26
End: 2021-07-16
Payer: COMMERCIAL

## 2021-07-16 VITALS
TEMPERATURE: 98 F | WEIGHT: 220 LBS | RESPIRATION RATE: 18 BRPM | SYSTOLIC BLOOD PRESSURE: 110 MMHG | HEIGHT: 70 IN | OXYGEN SATURATION: 98 % | DIASTOLIC BLOOD PRESSURE: 65 MMHG | HEART RATE: 88 BPM | BODY MASS INDEX: 31.5 KG/M2

## 2021-07-16 DIAGNOSIS — Z20.822 CONTACT WITH AND (SUSPECTED) EXPOSURE TO COVID-19: Primary | ICD-10-CM

## 2021-07-16 LAB
CTP QC/QA: YES
SARS-COV-2 RDRP RESP QL NAA+PROBE: NEGATIVE

## 2021-07-16 PROCEDURE — 99213 PR OFFICE/OUTPT VISIT, EST, LEVL III, 20-29 MIN: ICD-10-PCS | Mod: 95,S$GLB,, | Performed by: FAMILY MEDICINE

## 2021-07-16 PROCEDURE — 99213 OFFICE O/P EST LOW 20 MIN: CPT | Mod: 95,S$GLB,, | Performed by: FAMILY MEDICINE

## 2021-07-16 PROCEDURE — 3008F PR BODY MASS INDEX (BMI) DOCUMENTED: ICD-10-PCS | Mod: CPTII,S$GLB,, | Performed by: FAMILY MEDICINE

## 2021-07-16 PROCEDURE — 3008F BODY MASS INDEX DOCD: CPT | Mod: CPTII,S$GLB,, | Performed by: FAMILY MEDICINE

## 2021-07-16 PROCEDURE — U0002 COVID-19 LAB TEST NON-CDC: HCPCS | Mod: QW,S$GLB,, | Performed by: FAMILY MEDICINE

## 2021-07-16 PROCEDURE — U0002: ICD-10-PCS | Mod: QW,S$GLB,, | Performed by: FAMILY MEDICINE

## 2021-07-23 ENCOUNTER — HOSPITAL ENCOUNTER (OUTPATIENT)
Dept: RADIOLOGY | Facility: HOSPITAL | Age: 26
Discharge: HOME | End: 2021-07-23
Attending: PHYSICIAN ASSISTANT
Payer: COMMERCIAL

## 2021-07-23 DIAGNOSIS — M25.561 ACUTE PAIN OF RIGHT KNEE: ICD-10-CM

## 2021-07-23 DIAGNOSIS — M25.561 RIGHT KNEE PAIN: ICD-10-CM

## 2021-07-23 PROCEDURE — 73721 MRI JNT OF LWR EXTRE W/O DYE: CPT | Mod: TC,RT

## 2021-07-23 PROCEDURE — 73721 MRI KNEE WITHOUT CONTRAST RIGHT: ICD-10-PCS | Mod: 26,RT,, | Performed by: RADIOLOGY

## 2021-07-23 PROCEDURE — 73721 MRI JNT OF LWR EXTRE W/O DYE: CPT | Mod: 26,RT,, | Performed by: RADIOLOGY

## 2021-07-27 ENCOUNTER — PATIENT MESSAGE (OUTPATIENT)
Dept: SPORTS MEDICINE | Facility: CLINIC | Age: 26
End: 2021-07-27

## 2021-07-27 ENCOUNTER — TELEPHONE (OUTPATIENT)
Dept: SPORTS MEDICINE | Facility: CLINIC | Age: 26
End: 2021-07-27

## 2021-07-27 DIAGNOSIS — M25.561 ACUTE PAIN OF RIGHT KNEE: ICD-10-CM

## 2021-07-27 DIAGNOSIS — S83.411A SPRAIN OF MEDIAL COLLATERAL LIGAMENT OF RIGHT KNEE, INITIAL ENCOUNTER: ICD-10-CM

## 2021-07-27 DIAGNOSIS — M25.561 ACUTE PAIN OF RIGHT KNEE: Primary | ICD-10-CM

## 2021-07-27 RX ORDER — MELOXICAM 15 MG/1
TABLET ORAL
Qty: 90 TABLET | OUTPATIENT
Start: 2021-07-27

## 2021-07-27 RX ORDER — MELOXICAM 15 MG/1
TABLET ORAL
Qty: 21 TABLET | Refills: 0 | Status: SHIPPED | OUTPATIENT
Start: 2021-07-27 | End: 2021-08-15

## 2021-07-30 ENCOUNTER — OFFICE VISIT (OUTPATIENT)
Dept: URGENT CARE | Facility: CLINIC | Age: 26
End: 2021-07-30
Payer: COMMERCIAL

## 2021-07-30 VITALS
HEART RATE: 101 BPM | RESPIRATION RATE: 15 BRPM | TEMPERATURE: 98 F | OXYGEN SATURATION: 98 % | HEIGHT: 70 IN | SYSTOLIC BLOOD PRESSURE: 112 MMHG | DIASTOLIC BLOOD PRESSURE: 78 MMHG | WEIGHT: 210 LBS | BODY MASS INDEX: 30.06 KG/M2

## 2021-07-30 DIAGNOSIS — J02.9 SORE THROAT: ICD-10-CM

## 2021-07-30 DIAGNOSIS — K12.1 ORAL ULCERATION: Primary | ICD-10-CM

## 2021-07-30 LAB
CTP QC/QA: YES
CTP QC/QA: YES
MOLECULAR STREP A: NEGATIVE
SARS-COV-2 RDRP RESP QL NAA+PROBE: NEGATIVE

## 2021-07-30 PROCEDURE — 3078F DIAST BP <80 MM HG: CPT | Mod: CPTII,S$GLB,, | Performed by: PHYSICIAN ASSISTANT

## 2021-07-30 PROCEDURE — U0002: ICD-10-PCS | Mod: QW,S$GLB,, | Performed by: PHYSICIAN ASSISTANT

## 2021-07-30 PROCEDURE — U0002 COVID-19 LAB TEST NON-CDC: HCPCS | Mod: QW,S$GLB,, | Performed by: PHYSICIAN ASSISTANT

## 2021-07-30 PROCEDURE — 1159F PR MEDICATION LIST DOCUMENTED IN MEDICAL RECORD: ICD-10-PCS | Mod: CPTII,S$GLB,, | Performed by: PHYSICIAN ASSISTANT

## 2021-07-30 PROCEDURE — 1160F RVW MEDS BY RX/DR IN RCRD: CPT | Mod: CPTII,S$GLB,, | Performed by: PHYSICIAN ASSISTANT

## 2021-07-30 PROCEDURE — 99214 OFFICE O/P EST MOD 30 MIN: CPT | Mod: S$GLB,,, | Performed by: PHYSICIAN ASSISTANT

## 2021-07-30 PROCEDURE — 3008F BODY MASS INDEX DOCD: CPT | Mod: CPTII,S$GLB,, | Performed by: PHYSICIAN ASSISTANT

## 2021-07-30 PROCEDURE — 3008F PR BODY MASS INDEX (BMI) DOCUMENTED: ICD-10-PCS | Mod: CPTII,S$GLB,, | Performed by: PHYSICIAN ASSISTANT

## 2021-07-30 PROCEDURE — 1160F PR REVIEW ALL MEDS BY PRESCRIBER/CLIN PHARMACIST DOCUMENTED: ICD-10-PCS | Mod: CPTII,S$GLB,, | Performed by: PHYSICIAN ASSISTANT

## 2021-07-30 PROCEDURE — 1159F MED LIST DOCD IN RCRD: CPT | Mod: CPTII,S$GLB,, | Performed by: PHYSICIAN ASSISTANT

## 2021-07-30 PROCEDURE — 87651 POCT STREP A MOLECULAR: ICD-10-PCS | Mod: QW,S$GLB,, | Performed by: PHYSICIAN ASSISTANT

## 2021-07-30 PROCEDURE — 87651 STREP A DNA AMP PROBE: CPT | Mod: QW,S$GLB,, | Performed by: PHYSICIAN ASSISTANT

## 2021-07-30 PROCEDURE — 3074F SYST BP LT 130 MM HG: CPT | Mod: CPTII,S$GLB,, | Performed by: PHYSICIAN ASSISTANT

## 2021-07-30 PROCEDURE — 3074F PR MOST RECENT SYSTOLIC BLOOD PRESSURE < 130 MM HG: ICD-10-PCS | Mod: CPTII,S$GLB,, | Performed by: PHYSICIAN ASSISTANT

## 2021-07-30 PROCEDURE — 3078F PR MOST RECENT DIASTOLIC BLOOD PRESSURE < 80 MM HG: ICD-10-PCS | Mod: CPTII,S$GLB,, | Performed by: PHYSICIAN ASSISTANT

## 2021-07-30 PROCEDURE — 99214 PR OFFICE/OUTPT VISIT, EST, LEVL IV, 30-39 MIN: ICD-10-PCS | Mod: S$GLB,,, | Performed by: PHYSICIAN ASSISTANT

## 2021-08-04 ENCOUNTER — CLINICAL SUPPORT (OUTPATIENT)
Dept: REHABILITATION | Facility: HOSPITAL | Age: 26
End: 2021-08-04
Attending: PHYSICIAN ASSISTANT
Payer: COMMERCIAL

## 2021-08-04 DIAGNOSIS — Z74.09 IMPAIRED FUNCTIONAL MOBILITY, BALANCE, GAIT, AND ENDURANCE: ICD-10-CM

## 2021-08-04 DIAGNOSIS — R29.898 WEAKNESS OF RIGHT LOWER EXTREMITY: ICD-10-CM

## 2021-08-04 DIAGNOSIS — M25.661 DECREASED RANGE OF MOTION (ROM) OF RIGHT KNEE: ICD-10-CM

## 2021-08-04 DIAGNOSIS — S83.411A SPRAIN OF MEDIAL COLLATERAL LIGAMENT OF RIGHT KNEE, INITIAL ENCOUNTER: ICD-10-CM

## 2021-08-04 DIAGNOSIS — M25.561 ACUTE PAIN OF RIGHT KNEE: ICD-10-CM

## 2021-08-04 PROCEDURE — 97161 PT EVAL LOW COMPLEX 20 MIN: CPT

## 2021-08-04 PROCEDURE — 97110 THERAPEUTIC EXERCISES: CPT | Mod: 97

## 2021-08-18 ENCOUNTER — TELEPHONE (OUTPATIENT)
Dept: REHABILITATION | Facility: HOSPITAL | Age: 26
End: 2021-08-18

## 2021-08-18 DIAGNOSIS — Z74.09 IMPAIRED FUNCTIONAL MOBILITY, BALANCE, GAIT, AND ENDURANCE: ICD-10-CM

## 2021-08-18 DIAGNOSIS — M25.661 DECREASED RANGE OF MOTION (ROM) OF RIGHT KNEE: Primary | ICD-10-CM

## 2021-08-18 DIAGNOSIS — R29.898 WEAKNESS OF RIGHT LOWER EXTREMITY: ICD-10-CM

## 2021-09-07 ENCOUNTER — TELEPHONE (OUTPATIENT)
Dept: OBSTETRICS AND GYNECOLOGY | Facility: CLINIC | Age: 26
End: 2021-09-07

## 2021-09-28 ENCOUNTER — TELEPHONE (OUTPATIENT)
Dept: OBSTETRICS AND GYNECOLOGY | Facility: CLINIC | Age: 26
End: 2021-09-28

## 2021-09-28 ENCOUNTER — OFFICE VISIT (OUTPATIENT)
Dept: OBSTETRICS AND GYNECOLOGY | Facility: CLINIC | Age: 26
End: 2021-09-28
Payer: COMMERCIAL

## 2021-09-28 VITALS
WEIGHT: 232.56 LBS | SYSTOLIC BLOOD PRESSURE: 112 MMHG | HEIGHT: 70 IN | BODY MASS INDEX: 33.29 KG/M2 | DIASTOLIC BLOOD PRESSURE: 74 MMHG

## 2021-09-28 DIAGNOSIS — Z30.431 ENCOUNTER FOR ROUTINE CHECKING OF INTRAUTERINE CONTRACEPTIVE DEVICE (IUD): ICD-10-CM

## 2021-09-28 DIAGNOSIS — Z01.419 ENCOUNTER FOR ANNUAL ROUTINE GYNECOLOGICAL EXAMINATION: Primary | ICD-10-CM

## 2021-09-28 DIAGNOSIS — Z11.3 SCREEN FOR STD (SEXUALLY TRANSMITTED DISEASE): ICD-10-CM

## 2021-09-28 PROCEDURE — 1159F PR MEDICATION LIST DOCUMENTED IN MEDICAL RECORD: ICD-10-PCS | Mod: CPTII,S$GLB,, | Performed by: OBSTETRICS & GYNECOLOGY

## 2021-09-28 PROCEDURE — 99395 PREV VISIT EST AGE 18-39: CPT | Mod: S$GLB,,, | Performed by: OBSTETRICS & GYNECOLOGY

## 2021-09-28 PROCEDURE — 1160F PR REVIEW ALL MEDS BY PRESCRIBER/CLIN PHARMACIST DOCUMENTED: ICD-10-PCS | Mod: CPTII,S$GLB,, | Performed by: OBSTETRICS & GYNECOLOGY

## 2021-09-28 PROCEDURE — 87591 N.GONORRHOEAE DNA AMP PROB: CPT | Mod: 59 | Performed by: OBSTETRICS & GYNECOLOGY

## 2021-09-28 PROCEDURE — 1159F MED LIST DOCD IN RCRD: CPT | Mod: CPTII,S$GLB,, | Performed by: OBSTETRICS & GYNECOLOGY

## 2021-09-28 PROCEDURE — 1160F RVW MEDS BY RX/DR IN RCRD: CPT | Mod: CPTII,S$GLB,, | Performed by: OBSTETRICS & GYNECOLOGY

## 2021-09-28 PROCEDURE — 3078F DIAST BP <80 MM HG: CPT | Mod: CPTII,S$GLB,, | Performed by: OBSTETRICS & GYNECOLOGY

## 2021-09-28 PROCEDURE — 87491 CHLMYD TRACH DNA AMP PROBE: CPT | Mod: 59 | Performed by: OBSTETRICS & GYNECOLOGY

## 2021-09-28 PROCEDURE — 3074F PR MOST RECENT SYSTOLIC BLOOD PRESSURE < 130 MM HG: ICD-10-PCS | Mod: CPTII,S$GLB,, | Performed by: OBSTETRICS & GYNECOLOGY

## 2021-09-28 PROCEDURE — 87481 CANDIDA DNA AMP PROBE: CPT | Mod: 59 | Performed by: OBSTETRICS & GYNECOLOGY

## 2021-09-28 PROCEDURE — 3008F BODY MASS INDEX DOCD: CPT | Mod: CPTII,S$GLB,, | Performed by: OBSTETRICS & GYNECOLOGY

## 2021-09-28 PROCEDURE — 88175 CYTOPATH C/V AUTO FLUID REDO: CPT | Performed by: OBSTETRICS & GYNECOLOGY

## 2021-09-28 PROCEDURE — 3008F PR BODY MASS INDEX (BMI) DOCUMENTED: ICD-10-PCS | Mod: CPTII,S$GLB,, | Performed by: OBSTETRICS & GYNECOLOGY

## 2021-09-28 PROCEDURE — 3074F SYST BP LT 130 MM HG: CPT | Mod: CPTII,S$GLB,, | Performed by: OBSTETRICS & GYNECOLOGY

## 2021-09-28 PROCEDURE — 3078F PR MOST RECENT DIASTOLIC BLOOD PRESSURE < 80 MM HG: ICD-10-PCS | Mod: CPTII,S$GLB,, | Performed by: OBSTETRICS & GYNECOLOGY

## 2021-09-28 PROCEDURE — 87624 HPV HI-RISK TYP POOLED RSLT: CPT | Performed by: OBSTETRICS & GYNECOLOGY

## 2021-09-28 PROCEDURE — 99395 PR PREVENTIVE VISIT,EST,18-39: ICD-10-PCS | Mod: S$GLB,,, | Performed by: OBSTETRICS & GYNECOLOGY

## 2021-09-30 LAB
C TRACH DNA SPEC QL NAA+PROBE: NOT DETECTED
N GONORRHOEA DNA SPEC QL NAA+PROBE: NOT DETECTED

## 2021-10-01 LAB
BACTERIAL VAGINOSIS DNA: POSITIVE
CANDIDA GLABRATA DNA: NEGATIVE
CANDIDA KRUSEI DNA: NEGATIVE
CANDIDA RRNA VAG QL PROBE: NEGATIVE
T VAGINALIS RRNA GENITAL QL PROBE: NEGATIVE

## 2021-10-03 DIAGNOSIS — B96.89 BV (BACTERIAL VAGINOSIS): Primary | ICD-10-CM

## 2021-10-03 DIAGNOSIS — N76.0 BV (BACTERIAL VAGINOSIS): Primary | ICD-10-CM

## 2021-10-03 RX ORDER — METRONIDAZOLE 7.5 MG/G
1 GEL VAGINAL NIGHTLY
Qty: 5 APPLICATOR | Refills: 0 | Status: SHIPPED | OUTPATIENT
Start: 2021-10-03 | End: 2021-10-08

## 2021-10-04 ENCOUNTER — LAB VISIT (OUTPATIENT)
Dept: LAB | Facility: OTHER | Age: 26
End: 2021-10-04
Attending: OBSTETRICS & GYNECOLOGY
Payer: COMMERCIAL

## 2021-10-04 DIAGNOSIS — Z11.3 SCREEN FOR STD (SEXUALLY TRANSMITTED DISEASE): ICD-10-CM

## 2021-10-04 PROCEDURE — 87389 HIV-1 AG W/HIV-1&-2 AB AG IA: CPT | Performed by: OBSTETRICS & GYNECOLOGY

## 2021-10-04 PROCEDURE — 36415 COLL VENOUS BLD VENIPUNCTURE: CPT | Performed by: OBSTETRICS & GYNECOLOGY

## 2021-10-04 PROCEDURE — 86803 HEPATITIS C AB TEST: CPT | Performed by: OBSTETRICS & GYNECOLOGY

## 2021-10-04 PROCEDURE — 87340 HEPATITIS B SURFACE AG IA: CPT | Performed by: OBSTETRICS & GYNECOLOGY

## 2021-10-04 PROCEDURE — 86592 SYPHILIS TEST NON-TREP QUAL: CPT | Performed by: OBSTETRICS & GYNECOLOGY

## 2021-10-05 LAB
CLINICAL INFO: NORMAL
CYTO CVX: NORMAL
CYTOLOGIST CVX/VAG CYTO: NORMAL
CYTOLOGIST CVX/VAG CYTO: NORMAL
CYTOLOGY CMNT CVX/VAG CYTO-IMP: NORMAL
CYTOLOGY PAP THIN PREP EXPLANATION: NORMAL
DATE OF PREVIOUS PAP: NO
DATE PREVIOUS BX: NO
GEN CATEG CVX/VAG CYTO-IMP: NORMAL
HBV SURFACE AG SERPL QL IA: NEGATIVE
HCV AB SERPL QL IA: NEGATIVE
HIV 1+2 AB+HIV1 P24 AG SERPL QL IA: NEGATIVE
HPV I/H RISK 4 DNA CVX QL NAA+PROBE: NOT DETECTED
LMP START DATE: NORMAL
MICROORGANISM CVX/VAG CYTO: NORMAL
PATHOLOGIST CVX/VAG CYTO: NORMAL
RPR SER QL: NORMAL
SERVICE CMNT-IMP: NORMAL
SPECIMEN SOURCE CVX/VAG CYTO: NORMAL
STAT OF ADQ CVX/VAG CYTO-IMP: NORMAL

## 2021-10-21 ENCOUNTER — TELEPHONE (OUTPATIENT)
Dept: OBSTETRICS AND GYNECOLOGY | Facility: CLINIC | Age: 26
End: 2021-10-21

## 2021-10-21 ENCOUNTER — PROCEDURE VISIT (OUTPATIENT)
Dept: OBSTETRICS AND GYNECOLOGY | Facility: CLINIC | Age: 26
End: 2021-10-21
Payer: COMMERCIAL

## 2021-10-21 VITALS
DIASTOLIC BLOOD PRESSURE: 80 MMHG | HEIGHT: 70 IN | SYSTOLIC BLOOD PRESSURE: 118 MMHG | BODY MASS INDEX: 33.08 KG/M2 | WEIGHT: 231.06 LBS

## 2021-10-21 DIAGNOSIS — Z30.433 ENCOUNTER FOR REMOVAL AND REINSERTION OF INTRAUTERINE CONTRACEPTIVE DEVICE (IUD): Primary | ICD-10-CM

## 2021-10-21 PROCEDURE — 99499 NO LOS: ICD-10-PCS | Mod: S$GLB,,, | Performed by: OBSTETRICS & GYNECOLOGY

## 2021-10-21 PROCEDURE — 99499 UNLISTED E&M SERVICE: CPT | Mod: S$GLB,,, | Performed by: OBSTETRICS & GYNECOLOGY

## 2021-10-21 PROCEDURE — 58300 INSERT INTRAUTERINE DEVICE: CPT | Mod: S$GLB,,, | Performed by: OBSTETRICS & GYNECOLOGY

## 2021-10-21 PROCEDURE — 58301 REMOVE INTRAUTERINE DEVICE: CPT | Mod: S$GLB,,, | Performed by: OBSTETRICS & GYNECOLOGY

## 2021-10-21 PROCEDURE — 58300 INSERTION OF IUD: ICD-10-PCS | Mod: S$GLB,,, | Performed by: OBSTETRICS & GYNECOLOGY

## 2021-10-21 PROCEDURE — 58301 REMOVAL OF IUD: ICD-10-PCS | Mod: S$GLB,,, | Performed by: OBSTETRICS & GYNECOLOGY

## 2021-11-23 ENCOUNTER — OFFICE VISIT (OUTPATIENT)
Dept: OBSTETRICS AND GYNECOLOGY | Facility: CLINIC | Age: 26
End: 2021-11-23
Payer: COMMERCIAL

## 2021-11-23 VITALS
BODY MASS INDEX: 32.61 KG/M2 | HEIGHT: 70 IN | WEIGHT: 227.75 LBS | SYSTOLIC BLOOD PRESSURE: 118 MMHG | DIASTOLIC BLOOD PRESSURE: 80 MMHG

## 2021-11-23 DIAGNOSIS — Z30.431 ENCOUNTER FOR ROUTINE CHECKING OF INTRAUTERINE CONTRACEPTIVE DEVICE (IUD): Primary | ICD-10-CM

## 2021-11-23 PROCEDURE — 99213 OFFICE O/P EST LOW 20 MIN: CPT | Mod: S$GLB,,, | Performed by: OBSTETRICS & GYNECOLOGY

## 2021-11-23 PROCEDURE — 99213 PR OFFICE/OUTPT VISIT, EST, LEVL III, 20-29 MIN: ICD-10-PCS | Mod: S$GLB,,, | Performed by: OBSTETRICS & GYNECOLOGY

## 2021-12-29 ENCOUNTER — OFFICE VISIT (OUTPATIENT)
Dept: OPTOMETRY | Facility: CLINIC | Age: 26
End: 2021-12-29
Payer: COMMERCIAL

## 2021-12-29 DIAGNOSIS — Z01.00 COMPLETE EYE EXAM, ENCOUNTER FOR: ICD-10-CM

## 2021-12-29 DIAGNOSIS — Z98.890 S/P LASIK SURGERY OF BOTH EYES: ICD-10-CM

## 2021-12-29 DIAGNOSIS — H53.60 NIGHT BLINDNESS: ICD-10-CM

## 2021-12-29 DIAGNOSIS — H53.10 SUBJECTIVE VISUAL DISTURBANCE: ICD-10-CM

## 2021-12-29 DIAGNOSIS — H52.13 MYOPIA OF BOTH EYES: Primary | ICD-10-CM

## 2021-12-29 PROCEDURE — 92015 PR REFRACTION: ICD-10-PCS | Mod: S$GLB,,, | Performed by: OPTOMETRIST

## 2021-12-29 PROCEDURE — 92004 COMPRE OPH EXAM NEW PT 1/>: CPT | Mod: S$GLB,,, | Performed by: OPTOMETRIST

## 2021-12-29 PROCEDURE — 99999 PR PBB SHADOW E&M-EST. PATIENT-LVL II: CPT | Mod: PBBFAC,,, | Performed by: OPTOMETRIST

## 2021-12-29 PROCEDURE — 92004 PR EYE EXAM, NEW PATIENT,COMPREHESV: ICD-10-PCS | Mod: S$GLB,,, | Performed by: OPTOMETRIST

## 2021-12-29 PROCEDURE — 1159F PR MEDICATION LIST DOCUMENTED IN MEDICAL RECORD: ICD-10-PCS | Mod: CPTII,S$GLB,, | Performed by: OPTOMETRIST

## 2021-12-29 PROCEDURE — 92015 DETERMINE REFRACTIVE STATE: CPT | Mod: S$GLB,,, | Performed by: OPTOMETRIST

## 2021-12-29 PROCEDURE — 1159F MED LIST DOCD IN RCRD: CPT | Mod: CPTII,S$GLB,, | Performed by: OPTOMETRIST

## 2021-12-29 PROCEDURE — 99999 PR PBB SHADOW E&M-EST. PATIENT-LVL II: ICD-10-PCS | Mod: PBBFAC,,, | Performed by: OPTOMETRIST

## 2023-06-22 ENCOUNTER — OFFICE VISIT (OUTPATIENT)
Dept: URGENT CARE | Facility: CLINIC | Age: 28
End: 2023-06-22
Payer: COMMERCIAL

## 2023-06-22 ENCOUNTER — TELEPHONE (OUTPATIENT)
Dept: URGENT CARE | Facility: CLINIC | Age: 28
End: 2023-06-22

## 2023-06-22 VITALS
OXYGEN SATURATION: 97 % | HEIGHT: 70 IN | BODY MASS INDEX: 32.5 KG/M2 | DIASTOLIC BLOOD PRESSURE: 77 MMHG | HEART RATE: 76 BPM | RESPIRATION RATE: 16 BRPM | SYSTOLIC BLOOD PRESSURE: 115 MMHG | TEMPERATURE: 99 F | WEIGHT: 227 LBS

## 2023-06-22 DIAGNOSIS — J02.9 SORE THROAT: Primary | ICD-10-CM

## 2023-06-22 DIAGNOSIS — R11.0 NAUSEA: ICD-10-CM

## 2023-06-22 LAB
CTP QC/QA: YES
MOLECULAR STREP A: NEGATIVE

## 2023-06-22 PROCEDURE — 87651 POCT STREP A MOLECULAR: ICD-10-PCS | Mod: QW,S$GLB,, | Performed by: FAMILY MEDICINE

## 2023-06-22 PROCEDURE — 99203 OFFICE O/P NEW LOW 30 MIN: CPT | Mod: S$GLB,,, | Performed by: FAMILY MEDICINE

## 2023-06-22 PROCEDURE — 99203 PR OFFICE/OUTPT VISIT, NEW, LEVL III, 30-44 MIN: ICD-10-PCS | Mod: S$GLB,,, | Performed by: FAMILY MEDICINE

## 2023-06-22 PROCEDURE — 87651 STREP A DNA AMP PROBE: CPT | Mod: QW,S$GLB,, | Performed by: FAMILY MEDICINE

## 2023-06-22 RX ORDER — ONDANSETRON 4 MG/1
4 TABLET, ORALLY DISINTEGRATING ORAL
Status: DISCONTINUED | OUTPATIENT
Start: 2023-06-22 | End: 2023-06-22

## 2023-06-22 RX ORDER — IPRATROPIUM BROMIDE 21 UG/1
2 SPRAY, METERED NASAL 2 TIMES DAILY PRN
Qty: 30 ML | Refills: 0 | Status: SHIPPED | OUTPATIENT
Start: 2023-06-22

## 2023-06-22 NOTE — TELEPHONE ENCOUNTER
Called pt and answered questions pt.  Was upset that she was discharged by MA.  Explained her meds and the info about   Sore throat that was given to her.

## 2023-06-22 NOTE — PROGRESS NOTES
Subjective:      Patient ID: Koki Solano is a 27 y.o. female.    Vitals:  vitals were not taken for this visit.     Chief Complaint: Sore Throat    This is a 27 y.o. female who presents today with a chief complaint of sore throat. Pt started having a sore throat on Saturday. Pt has loss of voice.  Patient has been taking ibuprofen .        Sore Throat   This is a new problem. The current episode started in the past 7 days. The problem has been gradually worsening. Sore throat worse side: both. There has been no fever. The pain is at a severity of 4/10. Associated symptoms include a hoarse voice, swollen glands and trouble swallowing. She has tried NSAIDs for the symptoms. The treatment provided mild relief.     HENT:  Positive for sore throat and trouble swallowing.    Skin:  Negative for erythema.    Objective:     Physical Exam   Constitutional: She is oriented to person, place, and time. She appears ill. No distress.   HENT:   Head: Normocephalic and atraumatic.   Ears:   Right Ear: Tympanic membrane, external ear and ear canal normal.   Left Ear: Tympanic membrane, external ear and ear canal normal.   Nose: Rhinorrhea and congestion present.   Mouth/Throat: Mucous membranes are moist. Posterior oropharyngeal erythema present. Oropharynx is clear.   Eyes: Conjunctivae are normal. Pupils are equal, round, and reactive to light. Extraocular movement intact   Cardiovascular: Normal rate, regular rhythm, normal heart sounds and normal pulses.   Pulmonary/Chest: Effort normal and breath sounds normal. No respiratory distress.   Abdominal: Normal appearance and bowel sounds are normal. Soft. flat abdomen   Musculoskeletal: Normal range of motion.         General: No swelling or tenderness. Normal range of motion.   Neurological: no focal deficit. She is alert, oriented to person, place, and time and at baseline.   Skin: Skin is warm and dry. Capillary refill takes less than 2 seconds. No erythema  jaundice  Psychiatric: Her behavior is normal. Mood, judgment and thought content normal.   Nursing note and vitals reviewed.    Assessment:     Plan:   1. Sore throat  - POCT Strep A, Molecular  - benzocaine-menthoL 15-3.6 mg Lozg; 1 lozenge by Mucous Membrane route 2 (two) times daily as needed.  Dispense: 16 lozenge; Refill: 0  - ipratropium (ATROVENT) 21 mcg (0.03 %) nasal spray; 2 sprays by Each Nostril route 2 (two) times daily as needed for Rhinitis.  Dispense: 30 mL; Refill: 0    All results discussed prior to pt discharge from clinic

## 2024-05-19 ENCOUNTER — OFFICE VISIT (OUTPATIENT)
Dept: URGENT CARE | Facility: CLINIC | Age: 29
End: 2024-05-19
Payer: COMMERCIAL

## 2024-05-19 VITALS
OXYGEN SATURATION: 98 % | TEMPERATURE: 98 F | RESPIRATION RATE: 18 BRPM | BODY MASS INDEX: 32.5 KG/M2 | SYSTOLIC BLOOD PRESSURE: 117 MMHG | HEIGHT: 70 IN | HEART RATE: 77 BPM | DIASTOLIC BLOOD PRESSURE: 78 MMHG | WEIGHT: 227 LBS

## 2024-05-19 DIAGNOSIS — J98.8 BACTERIAL RESPIRATORY INFECTION: Primary | ICD-10-CM

## 2024-05-19 DIAGNOSIS — B96.89 BACTERIAL RESPIRATORY INFECTION: Primary | ICD-10-CM

## 2024-05-19 LAB
CTP QC/QA: YES
SARS-COV-2 AG RESP QL IA.RAPID: NEGATIVE

## 2024-05-19 PROCEDURE — 99213 OFFICE O/P EST LOW 20 MIN: CPT | Mod: S$GLB,,, | Performed by: NURSE PRACTITIONER

## 2024-05-19 PROCEDURE — 87811 SARS-COV-2 COVID19 W/OPTIC: CPT | Mod: QW,S$GLB,, | Performed by: NURSE PRACTITIONER

## 2024-05-19 RX ORDER — PROMETHAZINE HYDROCHLORIDE AND DEXTROMETHORPHAN HYDROBROMIDE 6.25; 15 MG/5ML; MG/5ML
5 SYRUP ORAL NIGHTLY PRN
Qty: 118 ML | Refills: 0 | Status: SHIPPED | OUTPATIENT
Start: 2024-05-19

## 2024-05-19 RX ORDER — BENZONATATE 200 MG/1
200 CAPSULE ORAL EVERY 8 HOURS PRN
Qty: 30 CAPSULE | Refills: 0 | Status: SHIPPED | OUTPATIENT
Start: 2024-05-19

## 2024-05-19 RX ORDER — AZITHROMYCIN 250 MG/1
TABLET, FILM COATED ORAL
Qty: 6 TABLET | Refills: 0 | Status: SHIPPED | OUTPATIENT
Start: 2024-05-19 | End: 2024-05-24

## 2024-05-19 NOTE — PATIENT INSTRUCTIONS
Oral fluids  Rest  Steam (hot showers, hot tea)  Blow nose often  Avoid circulating air (such as ceiling fans) dries your airway  Avoid drinking cold drinks (worsens cough)  Avoid strong smells which could worsen cough (perfume, lotions, smoke...)  During coughing fits: Wet washcloth with warm-hot water, wring out well, and cover nose and mouth-breathing through this helps to humidify the airway  You can also try a humidifier  Therapeutic coughing to expel mucous  Sit in upright position often

## 2024-05-19 NOTE — PROGRESS NOTES
"Subjective:      Patient ID: Koki Solano is a 28 y.o. female.    Vitals:  height is 5' 10" (1.778 m) and weight is 103 kg (227 lb). Her oral temperature is 98 °F (36.7 °C). Her blood pressure is 117/78 and her pulse is 77. Her respiration is 18 and oxygen saturation is 98%.     Chief Complaint: Cough    This is a 28 y.o. female who presents today with a chief complaint of cough, nasal/chest congestion, postnasal drip, and sinus pressure that started 3 days ago. Positive sick contacts (2 family members with pneumonia; and her mom is also ill).      Cough  This is a new problem. The cough is Productive of sputum. Associated symptoms include headaches, nasal congestion and postnasal drip. Pertinent negatives include no chest pain, chills, ear congestion, ear pain, fever, heartburn, hemoptysis, myalgias, rash, rhinorrhea, sore throat, shortness of breath, sweats, weight loss or wheezing. She has tried OTC cough suppressant for the symptoms. There is no history of asthma, COPD, emphysema or pneumonia.       Constitution: Negative for chills and fever.   HENT:  Positive for congestion, postnasal drip, sinus pressure and voice change. Negative for ear pain and sore throat.    Cardiovascular:  Negative for chest pain.   Respiratory:  Positive for cough. Negative for bloody sputum, shortness of breath and wheezing.    Gastrointestinal:  Negative for vomiting, diarrhea and heartburn.   Musculoskeletal:  Negative for muscle ache.   Skin:  Negative for rash.   Neurological:  Positive for headaches.      Objective:     Physical Exam   Constitutional: She is oriented to person, place, and time. She appears well-developed. She is cooperative.  Non-toxic appearance. She does not appear ill. No distress.   HENT:   Head: Normocephalic.   Ears:   Right Ear: Hearing, tympanic membrane, external ear and ear canal normal.   Left Ear: Hearing, tympanic membrane, external ear and ear canal normal.   Nose: Congestion present. No " mucosal edema, rhinorrhea or nasal deformity. No epistaxis. Right sinus exhibits no maxillary sinus tenderness and no frontal sinus tenderness. Left sinus exhibits no maxillary sinus tenderness and no frontal sinus tenderness.   Mouth/Throat: Uvula is midline, oropharynx is clear and moist and mucous membranes are normal. Mucous membranes are moist. No trismus in the jaw. Normal dentition. No uvula swelling. No oropharyngeal exudate, posterior oropharyngeal edema or posterior oropharyngeal erythema. Oropharynx is clear.      Comments: Hoarse voice noted  Eyes: Conjunctivae and lids are normal. No scleral icterus.   Neck: Trachea normal and phonation normal. Neck supple. No edema present. No erythema present. No neck rigidity present.   Cardiovascular: Normal rate and regular rhythm.   Pulmonary/Chest: Effort normal and breath sounds normal. No respiratory distress. She has no decreased breath sounds. She has no wheezes. She has no rhonchi.   Abdominal: Normal appearance.   Musculoskeletal: Normal range of motion.         General: No deformity. Normal range of motion.   Neurological: She is alert and oriented to person, place, and time. She exhibits normal muscle tone. Coordination normal.   Skin: Skin is warm, dry, intact, not diaphoretic and not pale.   Psychiatric: Her speech is normal and behavior is normal. Judgment and thought content normal.   Nursing note and vitals reviewed.    Results for orders placed or performed in visit on 05/19/24   SARS Coronavirus 2 Antigen, POCT Manual Read   Result Value Ref Range    SARS Coronavirus 2 Antigen Negative Negative     Acceptable Yes         Assessment:     1. Bacterial respiratory infection        Plan:       Bacterial respiratory infection  -     SARS Coronavirus 2 Antigen, POCT Manual Read  -     azithromycin (Z-GOPAL) 250 MG tablet; Take 2 tablets by mouth on day 1; Take 1 tablet by mouth on days 2-5  Dispense: 6 tablet; Refill: 0  -     benzonatate  (TESSALON) 200 MG capsule; Take 1 capsule (200 mg total) by mouth every 8 (eight) hours as needed for Cough.  Dispense: 30 capsule; Refill: 0  -     promethazine-dextromethorphan (PROMETHAZINE-DM) 6.25-15 mg/5 mL Syrp; Take 5 mLs by mouth nightly as needed (cough).  Dispense: 118 mL; Refill: 0      Patient Instructions   Oral fluids  Rest  Steam (hot showers, hot tea)  Blow nose often  Avoid circulating air (such as ceiling fans) dries your airway  Avoid drinking cold drinks (worsens cough)  Avoid strong smells which could worsen cough (perfume, lotions, smoke...)  During coughing fits: Wet washcloth with warm-hot water, wring out well, and cover nose and mouth-breathing through this helps to humidify the airway  You can also try a humidifier  Therapeutic coughing to expel mucous  Sit in upright position often

## 2025-08-07 ENCOUNTER — OFFICE VISIT (OUTPATIENT)
Dept: INTERNAL MEDICINE | Facility: CLINIC | Age: 30
End: 2025-08-07
Payer: COMMERCIAL

## 2025-08-07 VITALS
DIASTOLIC BLOOD PRESSURE: 75 MMHG | WEIGHT: 245.56 LBS | HEART RATE: 87 BPM | OXYGEN SATURATION: 98 % | HEIGHT: 70 IN | BODY MASS INDEX: 35.15 KG/M2 | SYSTOLIC BLOOD PRESSURE: 120 MMHG

## 2025-08-07 DIAGNOSIS — F90.0 ATTENTION DEFICIT HYPERACTIVITY DISORDER (ADHD), PREDOMINANTLY INATTENTIVE TYPE: ICD-10-CM

## 2025-08-07 DIAGNOSIS — Z00.00 ENCOUNTER FOR ANNUAL PHYSICAL EXAM: Primary | ICD-10-CM

## 2025-08-07 DIAGNOSIS — E66.812 CLASS 2 OBESITY WITHOUT SERIOUS COMORBIDITY WITH BODY MASS INDEX (BMI) OF 35.0 TO 35.9 IN ADULT, UNSPECIFIED OBESITY TYPE: ICD-10-CM

## 2025-08-07 DIAGNOSIS — F41.9 ANXIETY DISORDER, UNSPECIFIED TYPE: ICD-10-CM

## 2025-08-07 DIAGNOSIS — F33.42 RECURRENT MAJOR DEPRESSIVE DISORDER, IN FULL REMISSION: ICD-10-CM

## 2025-08-07 PROBLEM — Z74.09 IMPAIRED FUNCTIONAL MOBILITY, BALANCE, GAIT, AND ENDURANCE: Status: RESOLVED | Noted: 2021-08-04 | Resolved: 2025-08-07

## 2025-08-07 PROBLEM — R11.0 NAUSEA: Status: RESOLVED | Noted: 2023-06-22 | Resolved: 2025-08-07

## 2025-08-07 PROBLEM — R29.898 WEAKNESS OF RIGHT LOWER EXTREMITY: Status: RESOLVED | Noted: 2021-08-04 | Resolved: 2025-08-07

## 2025-08-07 PROBLEM — J02.9 SORE THROAT: Status: RESOLVED | Noted: 2023-06-22 | Resolved: 2025-08-07

## 2025-08-07 PROBLEM — M25.661 DECREASED RANGE OF MOTION (ROM) OF RIGHT KNEE: Status: RESOLVED | Noted: 2021-08-04 | Resolved: 2025-08-07

## 2025-08-07 PROCEDURE — 1159F MED LIST DOCD IN RCRD: CPT | Mod: CPTII,S$GLB,, | Performed by: STUDENT IN AN ORGANIZED HEALTH CARE EDUCATION/TRAINING PROGRAM

## 2025-08-07 PROCEDURE — 3078F DIAST BP <80 MM HG: CPT | Mod: CPTII,S$GLB,, | Performed by: STUDENT IN AN ORGANIZED HEALTH CARE EDUCATION/TRAINING PROGRAM

## 2025-08-07 PROCEDURE — 99999 PR PBB SHADOW E&M-EST. PATIENT-LVL III: CPT | Mod: PBBFAC,,, | Performed by: STUDENT IN AN ORGANIZED HEALTH CARE EDUCATION/TRAINING PROGRAM

## 2025-08-07 PROCEDURE — 1160F RVW MEDS BY RX/DR IN RCRD: CPT | Mod: CPTII,S$GLB,, | Performed by: STUDENT IN AN ORGANIZED HEALTH CARE EDUCATION/TRAINING PROGRAM

## 2025-08-07 PROCEDURE — 3008F BODY MASS INDEX DOCD: CPT | Mod: CPTII,S$GLB,, | Performed by: STUDENT IN AN ORGANIZED HEALTH CARE EDUCATION/TRAINING PROGRAM

## 2025-08-07 PROCEDURE — 3074F SYST BP LT 130 MM HG: CPT | Mod: CPTII,S$GLB,, | Performed by: STUDENT IN AN ORGANIZED HEALTH CARE EDUCATION/TRAINING PROGRAM

## 2025-08-07 PROCEDURE — 99385 PREV VISIT NEW AGE 18-39: CPT | Mod: S$GLB,,, | Performed by: STUDENT IN AN ORGANIZED HEALTH CARE EDUCATION/TRAINING PROGRAM

## 2025-08-07 RX ORDER — DEXTROAMPHETAMINE SACCHARATE, AMPHETAMINE ASPARTATE, DEXTROAMPHETAMINE SULFATE AND AMPHETAMINE SULFATE 1.25; 1.25; 1.25; 1.25 MG/1; MG/1; MG/1; MG/1
5 TABLET ORAL DAILY PRN
Qty: 20 TABLET | Refills: 0 | Status: SHIPPED | OUTPATIENT
Start: 2025-08-07

## 2025-08-07 NOTE — ASSESSMENT & PLAN NOTE
Reviewed psychiatry note from 2012  Diagnosed with ADHD at the age of 13 by psychological testing by Dr. Walker in [place]  Reviewed [place] :  Last refill for Vyvanse and Adderall in 2021  Previously also to Concerta but was discontinued due to side effects  Currently would like to re-initiate short-acting medications for as needed basis  Reports difficulty with executive functioning especially with initiating tasks  We will start with Adderall 5 mg daily as needed:  20 tablets given  Controlled substance agreement signed  Follow up with me in 6 months

## 2025-08-07 NOTE — ASSESSMENT & PLAN NOTE
Discuss permanent lifestyle changes:  Aerobic exercise 150 minutes per week.  Avoid soda, simple sugars, sweets, excessive rice, pasta, potatoes or bread.   Choose brown options when available and portion control.  Limit fast foods and fried foods and processed food.   Choose complex carbs in moderation (ex: green leafy vegetables, starchy vegetables beans, oatmeal).  Eat plenty of fresh fruits and vegetables with lean meats like chicken or fish daily.

## 2025-08-07 NOTE — PROGRESS NOTES
Patient ID: Koki Solano is a 29 y.o. female.  Chief Complaint: Establish Care and ADHD    Subjective:   History of Present Illness    CHIEF COMPLAINT:  Patient presents today to discuss resuming ADHD medication management and annual exam    ADHD:  She has a history of ADD/ADHD diagnosed in [id number]. She was previously treated with multiple stimulant medications including Vyvanse 50 mg and Adderall 30 mg, which were taken concurrently at one point.  She has also been on Concerta in the past but was discontinued due to side effects.  She currently experiences ongoing symptoms of inattention and difficulty with executive functioning, specifically challenges with task initiation. She reports that previous high medication dosages exacerbated her depression and anxiety symptoms, which has made her hesitant about resuming medication treatment. She expresses interest in exploring current medication management options for ADHD symptoms.    ANXIETY AND DEPRESSION:  She has a long-standing history of anxiety and depression, currently managed with Prozac. She acknowledges a history of suicide attempts.    SURGICAL HISTORY:  She had Lasik eye surgery in  and appendectomy in .    FAMILY HISTORY:  Mother has depression, history of miscarriages, acne, and skin cancer. Father has skin cancer, possibly melanoma. Sister 1 has skin cancer, psoriasis, and was hospitalized with leukemia in . Sister 2 has depression. Paternal grandfather had psoriasis, skin cancers, and dementia. Both grandfathers are , both grandmothers are alive.    SOCIAL HISTORY:  She is a  who started in 2022. She drinks alcohol socially, 1-2 drinks twice weekly. She occasionally uses marijuana gummies for sleep. She denies cigarette or vaping use.      ROS:  General: -fever, -chills, -fatigue, -weight gain, -weight loss  Eyes: -vision changes, -redness, -discharge  ENT: -ear pain, -nasal congestion, -sore  "throat  Cardiovascular: -chest pain, -palpitations, -lower extremity edema  Respiratory: -cough, -shortness of breath  Gastrointestinal: -abdominal pain, -nausea, -vomiting, -diarrhea, -constipation, -blood in stool  Genitourinary: -dysuria, -hematuria, -frequency  Musculoskeletal: -joint pain, -muscle pain  Skin: -rash, -lesion  Neurological: -headache, -dizziness, -numbness, -tingling  Psychiatric: +anxiety, +depression, -sleep difficulty, +increased distractibility, +lack of focus/concentration, +decreased functioning ability, +thought or thinking problems or concerns, +difficulty following instructions              Labs Reviewed  Objective:   /75 (Patient Position: Sitting)   Pulse 87   Ht 5' 10" (1.778 m)   Wt 111.4 kg (245 lb 9.5 oz)   SpO2 98%   BMI 35.24 kg/m²      Physical Exam    General: No acute distress. Well-developed. Well-nourished.  Eyes: EOMI. Sclerae anicteric.  HENT: Normocephalic. Atraumatic. Moist oral mucosa.  Ears: Bilateral TMs clear. Bilateral EACs clear.  Cardiovascular: Regular rate. Regular rhythm. No murmurs. No rubs. No gallops. Normal S1, S2.  Respiratory: Normal respiratory effort. Clear to auscultation bilaterally. No rales. No rhonchi. No wheezing.  Abdomen: Soft. Non-tender. Non-distended.   Musculoskeletal: No  obvious deformity.  Extremities: No lower extremity edema.  Neurological: Alert. No slurred speech. Normal gait.  Psychiatric: Normal mood. Normal affect. Good insight. Good judgment.  Skin: Warm. Dry. No rash.           Assessment:       1. Encounter for annual physical exam    2. Attention deficit hyperactivity disorder (ADHD), predominantly inattentive type    3. Anxiety disorder, unspecified type    4. Recurrent major depressive disorder, in full remission    5. Class 2 obesity without serious comorbidity with body mass index (BMI) of 35.0 to 35.9 in adult, unspecified obesity type              Plan:         1. Encounter for annual physical exam  -     [facility] " Auto Differential; Future; Expected date: 08/07/2025  -     Comprehensive Metabolic Panel; Future; Expected date: 08/07/2025  -     Lipid Panel; Future; Expected date: 08/07/2025  -     TSH; Future; Expected date: 08/07/2025  -     Hemoglobin A1C; Future; Expected date: 08/07/2025    2. Attention deficit hyperactivity disorder (ADHD), predominantly inattentive type  Assessment & Plan:  Reviewed psychiatry note from 2012  Diagnosed with ADHD at the age of 13 by psychological testing by Dr. Walker in [facility] [place] :  Last refill for Vyvanse and Adderall in 2021  Previously also to Concerta but was discontinued due to side effects  Currently would like to re-initiate short-acting medications for as needed basis  Reports difficulty with executive functioning especially with initiating tasks  We will start with Adderall 5 mg daily as needed:  20 tablets given  Controlled substance agreement signed  Follow up with me in 6 months    Orders:  -     dextroamphetamine-amphetamine (ADDERALL) 5 mg Tab; Take 5 mg by mouth daily as needed (ADHD).  Dispense: 20 tablet; Refill: 0    3. Anxiety disorder, unspecified type  Assessment & Plan:  Chronic, stable   Continue Prozac 20 mg daily      4. Recurrent major depressive disorder, in full remission  Assessment & Plan:  Chronic, stable   Continue home medication Prozac 20 mg daily      5. Class 2 obesity without serious comorbidity with body mass index (BMI) of 35.0 to 35.9 in adult, unspecified obesity type  Assessment & Plan:  Discuss permanent lifestyle changes:  Aerobic exercise 150 minutes per week.  Avoid soda, simple sugars, sweets, excessive rice, pasta, potatoes or bread.   Choose brown options when available and portion control.  Limit fast foods and fried foods and processed food.   Choose complex carbs in moderation (ex: green leafy vegetables, starchy vegetables beans, oatmeal).  Eat plenty of fresh fruits and vegetables with lean meats like chicken or fish  daily.            Health Maintenance     Health Maintenance Due   Topic    Lipid Panel         Follow up   Follow up in about 6 months (around 2/7/2026) for Annual.      This note was generated with the assistance of ambient listening technology. Verbal consent was obtained by the patient and accompanying visitor(s) for the recording of patient appointment to facilitate this note. I attest to having reviewed and edited the generated note for accuracy, though some syntax or spelling errors may persist. Please contact the author of this note for any clarification.           Ashlee Santiago MD  [id number] [place] [address]  Ph: [phone]

## 2025-08-08 ENCOUNTER — LAB VISIT (OUTPATIENT)
Dept: LAB | Facility: HOSPITAL | Age: 30
End: 2025-08-08
Payer: COMMERCIAL

## 2025-08-08 DIAGNOSIS — Z00.00 ENCOUNTER FOR ANNUAL PHYSICAL EXAM: ICD-10-CM

## 2025-08-08 LAB
ABSOLUTE EOSINOPHIL (OHS): 0.15 K/UL
ABSOLUTE MONOCYTE (OHS): 0.73 K/UL (ref 0.3–1)
ABSOLUTE NEUTROPHIL COUNT (OHS): 2.77 K/UL (ref 1.8–7.7)
ALBUMIN SERPL BCP-MCNC: 3.9 G/DL (ref 3.5–5.2)
ALP SERPL-CCNC: 55 UNIT/L (ref 40–150)
ALT SERPL W/O P-5'-P-CCNC: 20 UNIT/L (ref 0–55)
ANION GAP (OHS): 7 MMOL/L (ref 8–16)
AST SERPL-CCNC: 22 UNIT/L (ref 0–50)
BASOPHILS # BLD AUTO: 0.03 K/UL
BASOPHILS NFR BLD AUTO: 0.5 %
BILIRUB SERPL-MCNC: 0.4 MG/DL (ref 0.1–1)
BUN SERPL-MCNC: 12 MG/DL (ref 6–20)
CALCIUM SERPL-MCNC: 8.8 MG/DL (ref 8.7–10.5)
CHLORIDE SERPL-SCNC: 105 MMOL/L (ref 95–110)
CHOLEST SERPL-MCNC: 160 MG/DL (ref 120–199)
CHOLEST/HDLC SERPL: 3.1 {RATIO} (ref 2–5)
CO2 SERPL-SCNC: 24 MMOL/L (ref 23–29)
CREAT SERPL-MCNC: 0.8 MG/DL (ref 0.5–1.4)
EAG (OHS): 100 MG/DL (ref 68–131)
ERYTHROCYTE [DISTWIDTH] IN BLOOD BY AUTOMATED COUNT: 13.4 % (ref 11.5–14.5)
GFR SERPLBLD CREATININE-BSD FMLA CKD-EPI: >60 ML/MIN/1.73/M2
GLUCOSE SERPL-MCNC: 85 MG/DL (ref 70–110)
HBA1C MFR BLD: 5.1 % (ref 4–5.6)
HCT VFR BLD AUTO: 42.2 % (ref 37–48.5)
HDLC SERPL-MCNC: 52 MG/DL (ref 40–75)
HDLC SERPL: 32.5 % (ref 20–50)
HGB BLD-MCNC: 13.8 GM/DL (ref 12–16)
IMM GRANULOCYTES # BLD AUTO: 0.01 K/UL (ref 0–0.04)
IMM GRANULOCYTES NFR BLD AUTO: 0.2 % (ref 0–0.5)
LDLC SERPL CALC-MCNC: 88.2 MG/DL (ref 63–159)
LYMPHOCYTES # BLD AUTO: 2.61 K/UL (ref 1–4.8)
MCH RBC QN AUTO: 28.8 PG (ref 27–31)
MCHC RBC AUTO-ENTMCNC: 32.7 G/DL (ref 32–36)
MCV RBC AUTO: 88 FL (ref 82–98)
NONHDLC SERPL-MCNC: 108 MG/DL
NUCLEATED RBC (/100WBC) (OHS): 0 /100 WBC
PLATELET # BLD AUTO: 254 K/UL (ref 150–450)
PMV BLD AUTO: 10.4 FL (ref 9.2–12.9)
POTASSIUM SERPL-SCNC: 4.1 MMOL/L (ref 3.5–5.1)
PROT SERPL-MCNC: 6.8 GM/DL (ref 6–8.4)
RBC # BLD AUTO: 4.8 M/UL (ref 4–5.4)
RELATIVE EOSINOPHIL (OHS): 2.4 %
RELATIVE LYMPHOCYTE (OHS): 41.4 % (ref 18–48)
RELATIVE MONOCYTE (OHS): 11.6 % (ref 4–15)
RELATIVE NEUTROPHIL (OHS): 43.9 % (ref 38–73)
SODIUM SERPL-SCNC: 136 MMOL/L (ref 136–145)
TRIGL SERPL-MCNC: 99 MG/DL (ref 30–150)
TSH SERPL-ACNC: 1.04 UIU/ML (ref 0.4–4)
WBC # BLD AUTO: 6.3 K/UL (ref 3.9–12.7)

## 2025-08-08 PROCEDURE — 84443 ASSAY THYROID STIM HORMONE: CPT

## 2025-08-08 PROCEDURE — 85025 COMPLETE CBC W/AUTO DIFF WBC: CPT

## 2025-08-08 PROCEDURE — 82310 ASSAY OF CALCIUM: CPT

## 2025-08-08 PROCEDURE — 82465 ASSAY BLD/SERUM CHOLESTEROL: CPT

## 2025-08-08 PROCEDURE — 83036 HEMOGLOBIN GLYCOSYLATED A1C: CPT

## 2025-08-08 PROCEDURE — 36415 COLL VENOUS BLD VENIPUNCTURE: CPT

## 2025-08-28 ENCOUNTER — PATIENT OUTREACH (OUTPATIENT)
Dept: ADMINISTRATIVE | Facility: HOSPITAL | Age: 30
End: 2025-08-28
Payer: COMMERCIAL